# Patient Record
Sex: FEMALE | Race: BLACK OR AFRICAN AMERICAN | NOT HISPANIC OR LATINO | Employment: FULL TIME | URBAN - METROPOLITAN AREA
[De-identification: names, ages, dates, MRNs, and addresses within clinical notes are randomized per-mention and may not be internally consistent; named-entity substitution may affect disease eponyms.]

---

## 2021-12-06 ENCOUNTER — PATIENT MESSAGE (OUTPATIENT)
Dept: RESEARCH | Facility: HOSPITAL | Age: 31
End: 2021-12-06
Payer: MEDICAID

## 2021-12-29 ENCOUNTER — HOSPITAL ENCOUNTER (EMERGENCY)
Facility: HOSPITAL | Age: 31
Discharge: HOME | End: 2021-12-29
Attending: EMERGENCY MEDICINE
Payer: MEDICAID

## 2021-12-29 VITALS
RESPIRATION RATE: 18 BRPM | OXYGEN SATURATION: 100 % | HEIGHT: 65 IN | WEIGHT: 143 LBS | TEMPERATURE: 98 F | BODY MASS INDEX: 23.82 KG/M2 | SYSTOLIC BLOOD PRESSURE: 121 MMHG | HEART RATE: 73 BPM | DIASTOLIC BLOOD PRESSURE: 76 MMHG

## 2021-12-29 DIAGNOSIS — Z20.822 LAB TEST NEGATIVE FOR COVID-19 VIRUS: Primary | ICD-10-CM

## 2021-12-29 LAB — SARS-COV-2 RDRP RESP QL NAA+PROBE: NEGATIVE

## 2021-12-29 PROCEDURE — 99282 EMERGENCY DEPT VISIT SF MDM: CPT

## 2021-12-29 PROCEDURE — U0002 COVID-19 LAB TEST NON-CDC: HCPCS | Performed by: NURSE PRACTITIONER

## 2021-12-29 NOTE — Clinical Note
"Eddie"ROBERT Cheney was seen and treated in our emergency department on 12/29/2021.     COVID-19 is present in our communities across the state. There is limited testing for COVID at this time, so not all patients can be tested. In this situation, your employee meets the following criteria:    Eddie Cheney has met the criteria for COVID-19 testing and has a NEGATIVE result. The employee can return to work once they are asymptomatic for 72 hours without the use of fever reducing medications (Tylenol, Motrin, etc).     If you have any questions or concerns, or if I can be of further assistance, please do not hesitate to contact me.    Sincerely,             Joni Trimble NP"

## 2021-12-29 NOTE — ED PROVIDER NOTES
Encounter Date: 12/29/2021       History     Chief Complaint   Patient presents with    COVID-19 Concerns     Pt had recent exposure to COVID, reports chills, body aches, night sweats, HA.     Pt. C/o frontal headache relieved with motrin last night. Pt. Exposed to covid 19.     The history is provided by the patient.   URI  Primary symptoms do not include fever, fatigue, sore throat, cough, wheezing, nausea or rash.   The onset of the illness is associated with exposure to sick contacts. The following treatments were addressed: Acetaminophen was not tried. A decongestant was not tried.     Review of patient's allergies indicates:   Allergen Reactions    Tramadol      No past medical history on file.  No past surgical history on file.  Family History   Problem Relation Age of Onset    Cancer Neg Hx      Social History     Tobacco Use    Smoking status: Current Every Day Smoker     Packs/day: 0.50     Years: 10.00     Pack years: 5.00     Types: Cigarettes    Smokeless tobacco: Never Used   Substance Use Topics    Alcohol use: No    Drug use: Yes     Frequency: 7.0 times per week     Types: Marijuana     Review of Systems   Constitutional: Negative for fatigue and fever.   HENT: Negative for sore throat.    Respiratory: Negative for cough, shortness of breath and wheezing.    Cardiovascular: Negative for chest pain.   Gastrointestinal: Negative for nausea.   Genitourinary: Negative for dysuria.   Musculoskeletal: Negative for back pain.   Skin: Negative for rash.   Neurological: Negative for weakness.   Hematological: Does not bruise/bleed easily.       Physical Exam     Initial Vitals [12/29/21 1106]   BP Pulse Resp Temp SpO2   121/76 73 18 98.2 °F (36.8 °C) 100 %      MAP       --         Physical Exam    Nursing note and vitals reviewed.  Constitutional: She appears well-developed and well-nourished.   HENT:   Head: Normocephalic and atraumatic.   Eyes: Conjunctivae and EOM are normal. Pupils are equal,  round, and reactive to light.   Neck: Neck supple.   Normal range of motion.  Cardiovascular: Normal rate, regular rhythm, normal heart sounds and intact distal pulses.   Pulmonary/Chest: Breath sounds normal.   Abdominal: Abdomen is soft. There is no abdominal tenderness. There is no rebound and no guarding.   Musculoskeletal:         General: Normal range of motion.      Cervical back: Normal range of motion and neck supple.     Neurological: She is alert and oriented to person, place, and time. She has normal strength and normal reflexes.   Skin: Skin is warm and dry.   Psychiatric: She has a normal mood and affect. Her behavior is normal. Thought content normal.         ED Course   Procedures  Labs Reviewed   SARS-COV-2 RNA AMPLIFICATION, QUAL          Imaging Results    None          Medications - No data to display                       Clinical Impression:   Final diagnoses:  [Z20.822] Lab test negative for COVID-19 virus (Primary)          ED Disposition Condition    Discharge Stable        ED Prescriptions     None        Follow-up Information     Follow up With Specialties Details Why Contact Info    pcp  Schedule an appointment as soon as possible for a visit  As needed            Joni Trimble NP  12/29/21 2021

## 2022-04-07 LAB
PAP RECOMMENDATION EXT: NORMAL
PAP SMEAR: NORMAL

## 2023-01-20 ENCOUNTER — DOCUMENTATION ONLY (OUTPATIENT)
Dept: ADMINISTRATIVE | Facility: HOSPITAL | Age: 33
End: 2023-01-20
Payer: MEDICAID

## 2024-03-21 ENCOUNTER — OFFICE VISIT (OUTPATIENT)
Dept: OBSTETRICS AND GYNECOLOGY | Facility: CLINIC | Age: 34
End: 2024-03-21
Payer: MEDICAID

## 2024-03-21 VITALS
SYSTOLIC BLOOD PRESSURE: 108 MMHG | HEART RATE: 62 BPM | BODY MASS INDEX: 26.76 KG/M2 | WEIGHT: 160.81 LBS | DIASTOLIC BLOOD PRESSURE: 60 MMHG

## 2024-03-21 DIAGNOSIS — Z11.3 SCREEN FOR STD (SEXUALLY TRANSMITTED DISEASE): Primary | ICD-10-CM

## 2024-03-21 DIAGNOSIS — Z01.419 ROUTINE GYNECOLOGICAL EXAMINATION: ICD-10-CM

## 2024-03-21 PROCEDURE — 3078F DIAST BP <80 MM HG: CPT | Mod: CPTII,,, | Performed by: OBSTETRICS & GYNECOLOGY

## 2024-03-21 PROCEDURE — 99395 PREV VISIT EST AGE 18-39: CPT | Mod: ,,, | Performed by: OBSTETRICS & GYNECOLOGY

## 2024-03-21 PROCEDURE — 3074F SYST BP LT 130 MM HG: CPT | Mod: CPTII,,, | Performed by: OBSTETRICS & GYNECOLOGY

## 2024-03-21 PROCEDURE — 1160F RVW MEDS BY RX/DR IN RCRD: CPT | Mod: CPTII,,, | Performed by: OBSTETRICS & GYNECOLOGY

## 2024-03-21 PROCEDURE — 1159F MED LIST DOCD IN RCRD: CPT | Mod: CPTII,,, | Performed by: OBSTETRICS & GYNECOLOGY

## 2024-03-21 PROCEDURE — 3008F BODY MASS INDEX DOCD: CPT | Mod: CPTII,,, | Performed by: OBSTETRICS & GYNECOLOGY

## 2024-03-21 NOTE — PROGRESS NOTES
Patient ID: [id number]   Chief Complaint: Annual exam  Chief Complaint   Patient presents with    Well Woman     C/O IRREGULAR CYCLES  FOR TWO MONTHS     HPI:   Eddie Cheney is a 33 y.o. year old  here for her Annual Exam. Patient's last menstrual period was 2024 (exact date).   C/O IRREGULAR CYCLES IN  AND FEB, SPOTTING FOR FEW DAYS, THEN BEGINNING FULL CYCLE A WEEK LATER.   ON NO HORMONAL BIRTH CONTROL  HAS H/O ANXIETY AND DEPRESSION, ON NO MEDS, PT SELF D/C'D MEDS BECAUSE OF SIDE EFFECTS.   SHE FAILED THE DEPRESSION SCREEN, DECLINES MEDS OR CONSELING.     Subjective:     Past Medical History:   Diagnosis Date    Anxiety disorder, unspecified     Depression      Past Surgical History:   Procedure Laterality Date    ANKLE FRACTURE SURGERY Right     RIGHT HAND FRACTURE Right     WISDOM TOOTH EXTRACTION       Social History     Tobacco Use    Smoking status: Every Day     Current packs/day: 0.50     Average packs/day: 0.5 packs/day for 10.0 years (5.0 ttl pk-yrs)     Types: Cigarettes    Smokeless tobacco: Never   Substance Use Topics    Alcohol use: Yes     Comment: SOCIAL    Drug use: Yes     Frequency: 7.0 times per week     Types: Marijuana     Family History   Problem Relation Age of Onset    Diabetes Mother     Hypertension Father     Heart attack Father         X2    Cancer Neg Hx      OB History    Para Term  AB Living   2 2 2     2   SAB IAB Ectopic Multiple Live Births           2      # Outcome Date GA Lbr Tab/2nd Weight Sex Delivery Anes PTL Lv   2 Term 19    M Vag-Spont   JOAQUÍN   1 Term 13    M Vag-Spont   JOAQUÍN     No current outpatient medications on file.    MENARCHEAL:  Cycle Length: 5 days   Flow: normal  Dysmenorrhea: Yes  If yes: Mild  Intermenstrual Bleeding: Yes    PAP:  Last PAP: 2022    History of Abnormal PAP Smear: NO  HPV Vaccine: NO    INTERCOURSE:  Dyspareunia: No  Postcoital Bleeding: No  History of STI: Yes   If yes, then: CZ  Current Birth  Control Method: none  Sexually Active: yes    Review of Systems 12 point review of systems conducted, negative except as stated in the history of present illness.   See HPI for details.  Objective:   Visit Vitals  /60   Pulse 62   Wt 72.9 kg (160 lb 12.8 oz)   LMP 02/25/2024 (Exact Date)   BMI 26.76 kg/m²     No results found for this or any previous visit (from the past 24 hour(s)).  Physical Exam:  Chaperone present for exam.  Physical Exam  Constitutional:  General Appearance : alert, in no acute distress, normal, well nourished.  Cardiovascular:   Regular rate and rhythm.  Respiratory:  Respiratory Effort: normal.  Breast:  Right: Inspection/palpation: no discharge, no masses present, no nipple retraction, no skin changes, no skin dimpling, no tenderness, no lymphadenopathy, no axillary mass, no axillary tenderness.  Left: Inspection/palpation: no discharge, no masses present, no nipple retraction, no skin changes, no skin dimpling, no tenderness, no lymphadenopathy, no axillary mass, no axillary tenderness.  Gastrointestinal:  Abdomen: no masses. no tender, nondistended.  Hernias: no hernias present.  Genitourinary:  External Genitalia: normal, no lesions.  Vagina: normal appearance, no abnormal discharge, no lesions.  Bladder: no mass, nontender.  Urethra: no erythema or lesions present.  Cervix: no lesions, non tender. Pap Done STD testing Via pap.   Uterus: nontender, normal contour, normal mobility, normal size.   Adnexa: no masses, no tenderness.  Anus and Perineum: visually normal.     No results found for this or any previous visit (from the past 24 hour(s)).  Assessment/Plan:   Assessment:   Screen for STD (sexually transmitted disease)  -     Liquid-Based Pap Smear, Screening Screening; Future; Expected date: 03/21/2024    Routine gynecological examination  -     Liquid-Based Pap Smear, Screening Screening; Future; Expected date: 03/21/2024    Follow up in about 1 year (around 3/21/2025) for  ANNUAL.   In addition to their scheduled follow-up, the patient has also been instructed to follow up on as needed basis.   All questions were answered and the patient voiced understanding of the above issues.

## 2024-03-27 LAB — PSYCHE PATHOLOGY RESULT: NORMAL

## 2024-04-17 ENCOUNTER — PROCEDURE VISIT (OUTPATIENT)
Dept: OBSTETRICS AND GYNECOLOGY | Facility: CLINIC | Age: 34
End: 2024-04-17
Payer: MEDICAID

## 2024-04-17 VITALS
SYSTOLIC BLOOD PRESSURE: 110 MMHG | BODY MASS INDEX: 26.56 KG/M2 | HEART RATE: 95 BPM | WEIGHT: 159.63 LBS | DIASTOLIC BLOOD PRESSURE: 82 MMHG

## 2024-04-17 DIAGNOSIS — R87.610 ATYPICAL SQUAMOUS CELL CHANGES OF UNDETERMINED SIGNIFICANCE (ASCUS) ON CERVICAL CYTOLOGY WITH POSITIVE HIGH RISK HUMAN PAPILLOMA VIRUS (HPV): Primary | ICD-10-CM

## 2024-04-17 DIAGNOSIS — R87.810 ATYPICAL SQUAMOUS CELL CHANGES OF UNDETERMINED SIGNIFICANCE (ASCUS) ON CERVICAL CYTOLOGY WITH POSITIVE HIGH RISK HUMAN PAPILLOMA VIRUS (HPV): Primary | ICD-10-CM

## 2024-04-17 DIAGNOSIS — N92.6 IRREGULAR PERIODS/MENSTRUAL CYCLES: ICD-10-CM

## 2024-04-17 LAB
B-HCG UR QL: NEGATIVE
CTP QC/QA: YES
ERYTHROCYTE [DISTWIDTH] IN BLOOD BY AUTOMATED COUNT: 13.1 % (ref 11–14.5)
HCT VFR BLD AUTO: 38 % (ref 36–48)
HGB BLD-MCNC: 13.4 G/DL (ref 11.8–16)
MCH RBC QN AUTO: 31.6 PG (ref 27–34)
MCHC RBC AUTO-ENTMCNC: 35.3 G/DL (ref 31–37)
MCV RBC AUTO: 89.6 FL (ref 79–99)
NRBC BLD AUTO-RTO: 0 %
PLATELET # BLD AUTO: 283 X10(3)/MCL (ref 140–371)
PMV BLD AUTO: 11.6 FL (ref 9.4–12.4)
RBC # BLD AUTO: 4.24 X10(6)/MCL (ref 4–5.1)
T4 FREE SERPL-MCNC: 1.19 NG/DL (ref 0.78–2.19)
TSH SERPL-ACNC: 2.46 UIU/ML (ref 0.36–3.74)
WBC # SPEC AUTO: 8.42 X10(3)/MCL (ref 4–11.5)

## 2024-04-17 PROCEDURE — 85027 COMPLETE CBC AUTOMATED: CPT | Performed by: OBSTETRICS & GYNECOLOGY

## 2024-04-17 PROCEDURE — 81025 URINE PREGNANCY TEST: CPT | Mod: ,,, | Performed by: OBSTETRICS & GYNECOLOGY

## 2024-04-17 PROCEDURE — 84443 ASSAY THYROID STIM HORMONE: CPT | Performed by: OBSTETRICS & GYNECOLOGY

## 2024-04-17 PROCEDURE — 84439 ASSAY OF FREE THYROXINE: CPT | Performed by: OBSTETRICS & GYNECOLOGY

## 2024-04-17 PROCEDURE — 57452 EXAM OF CERVIX W/SCOPE: CPT | Mod: ,,, | Performed by: OBSTETRICS & GYNECOLOGY

## 2024-04-17 NOTE — PROGRESS NOTES
Patient ID: [id number]   Chief Complaint: Colposcopy, COMPLAINS OF IRREGULAR CYCLES.      HPI:   Eddie Cheney is a 33 y.o.  here today for Colposcopy   COMPLAINS OF IRREGULAR CYCLES THIS ENTIRE YEAR  HEAVY AT TIMES WITH CLOTS AND MODERATE DYSMENORRHEA.      Patient's last menstrual period was 2024 (approximate).  Past Medical History:  has a past medical history of Abnormal Pap smear of cervix, Anxiety disorder, unspecified, Depression, and HPV in female.  Surgical History:  has a past surgical history that includes Ankle fracture surgery (Right); RIGHT HAND FRACTURE (Right); and [place] tooth extraction.  Family History: family history includes Cancer in her maternal grandmother; Cervical cancer in her maternal grandmother; Diabetes in her mother; Heart disease in her father; Hypertension in her father.  Social History:  reports that she has been smoking cigarettes. She has a 5 pack-year smoking history. She has never used smokeless tobacco. She reports current alcohol use. She reports current drug use. Frequency: 7.00 times per week. Drug: Marijuana.  No current outpatient medications on file.     No current facility-administered medications for this visit.     Patient is allergic to tramadol.    Recent Results (from the past 24 hour(s))   POCT Urine Pregnancy    Collection Time: 24  3:18 PM   Result Value Ref Range    POC Preg Test, Ur Negative Negative     Acceptable Yes      Subjective:   Review of Systems  12 point review of systems conducted, negative except as stated in the history of present illness. See HPI for details.  Objective:     Visit Vitals  /82   Pulse 95   Wt 72.4 kg (159 lb 9.6 oz)   LMP 2024 (Approximate)   BMI 26.56 kg/m²     Recent Results (from the past 24 hour(s))   POCT Urine Pregnancy    Collection Time: 24  3:18 PM   Result Value Ref Range    POC Preg Test, Ur Negative Negative     Acceptable Yes      Physical  Exam  Constitutional:  General Appearance : alert, in no acute distress, normal, well nourished.  Neck/Thyroid:  Inspection/Palpation: normal. Thyroid: normal size and shape.  Respiratory:  Respiratory Effort: normal.  Gastrointestinal:  Abdomen: no masses. no tender, nondistended.  Liver and spleen: normal  Hernias: no hernias present, no inguinal adenopathy.  Genitourinary:  External Genitalia: normal, no lesions.  Vagina: normal appearance, no abnormal discharge, no lesions.  Bladder: no mass, nontender.  Urethra: no erythema or lesions present.  Cervix: no lesions, non tender. SEE COLPO NOTE  Uterus: nontender, normal contour, normal mobility, normal size.   Adnexa: no masses, no tenderness.  Anus and Perineum: visually normal.   Chaperone Present  Assessment:       ICD-10-CM ICD-9-CM   1. Atypical squamous cell changes of undetermined significance (ASCUS) on cervical cytology with positive high risk human papilloma virus (HPV)  R87.610 795.01    R87.810 795.05   2. Irregular periods/menstrual cycles  N92.6 626.4     Plan   Atypical squamous cell changes of undetermined significance (ASCUS) on cervical cytology with positive high risk human papilloma virus (HPV)  -     POCT Urine Pregnancy  -     Colposcopy    Irregular periods/menstrual cycles  -     CBC Without Differential  -     TSH  -     T4, Free  -     US Pelvis Limited Non OB; Future; Expected date: 04/17/2024    Follow up in about 2 weeks (around 5/1/2024), or RESULTS/one year for annual. In addition to their scheduled follow up, the patient has also been instructed to follow up on as needed basis.     ROSSANA RAMOS MD

## 2024-04-17 NOTE — PROCEDURES
Colposcopy    Date/Time: 4/17/2024 2:30 PM    Performed by: Chan Charlton MD  Authorized by: Chan Charlton MD    Consent obatined:  Prior to procedure the appropriate consent was completed and verified  Timeout:Immediately prior to procedure a time out was called to verify the correct patient, procedure, equipment, support staff and site/side marked as required  Prep:Patient was prepped and draped in the usual sterile fashion  Assistants?: Yes    List of assistants:  MONIKA EASON was present for the entire procedure.    Colposcopy Site:  Cervix  Position:  Supine  Acrowhite Lesion: No    Atypical Vessels: No    Transformation Zone Adequate?: Yes    Biopsy?: No    ECC Performed?: No    LEEP Performed?: No    Estimated blood loss (cc):  0   Patient tolerated the procedure well with no immediate complications.   Post-operative instructions were provided for the patient.   Patient was discharged and will follow up if any complications occur     PT WILL NEED RE-PAP IN ONE YEAR.

## 2024-05-01 ENCOUNTER — OFFICE VISIT (OUTPATIENT)
Dept: OBSTETRICS AND GYNECOLOGY | Facility: CLINIC | Age: 34
End: 2024-05-01
Payer: MEDICAID

## 2024-05-01 VITALS
WEIGHT: 155.63 LBS | HEART RATE: 78 BPM | BODY MASS INDEX: 25.93 KG/M2 | RESPIRATION RATE: 18 BRPM | OXYGEN SATURATION: 99 % | HEIGHT: 65 IN | DIASTOLIC BLOOD PRESSURE: 62 MMHG | SYSTOLIC BLOOD PRESSURE: 102 MMHG

## 2024-05-01 DIAGNOSIS — N92.6 IRREGULAR PERIODS/MENSTRUAL CYCLES: Primary | ICD-10-CM

## 2024-05-01 PROCEDURE — 3074F SYST BP LT 130 MM HG: CPT | Mod: CPTII,,, | Performed by: OBSTETRICS & GYNECOLOGY

## 2024-05-01 PROCEDURE — 3008F BODY MASS INDEX DOCD: CPT | Mod: CPTII,,, | Performed by: OBSTETRICS & GYNECOLOGY

## 2024-05-01 PROCEDURE — 1159F MED LIST DOCD IN RCRD: CPT | Mod: CPTII,,, | Performed by: OBSTETRICS & GYNECOLOGY

## 2024-05-01 PROCEDURE — 1160F RVW MEDS BY RX/DR IN RCRD: CPT | Mod: CPTII,,, | Performed by: OBSTETRICS & GYNECOLOGY

## 2024-05-01 PROCEDURE — 99213 OFFICE O/P EST LOW 20 MIN: CPT | Mod: ,,, | Performed by: OBSTETRICS & GYNECOLOGY

## 2024-05-01 PROCEDURE — 3078F DIAST BP <80 MM HG: CPT | Mod: CPTII,,, | Performed by: OBSTETRICS & GYNECOLOGY

## 2024-05-01 NOTE — PROGRESS NOTES
Patient ID: [id number]   Chief Complaint: Results (Presents to clinic for colpo results , lab and pelvic us results. )    HPI:   Eddie Cheney is a 33 y.o.  here today for lab and pelvic u/s results.  Colpo was negative.  Cbc and thyroid studies were normal.  U/s was also normal.   Discussed all methods for controlling cycles, patient opts for IUD.  Discussed placing on next cycle.      Patient's last menstrual period was 2024 (approximate).  Past Medical History:  has a past medical history of Abnormal Pap smear of cervix, Anxiety disorder, unspecified, Depression, and HPV in female.  Surgical History:  has a past surgical history that includes Ankle fracture surgery (Right); RIGHT HAND FRACTURE (Right); and [place] tooth extraction.  Family History: family history includes Cancer in her maternal grandmother; Cervical cancer in her maternal grandmother; Diabetes in her mother; Heart disease in her father; Hypertension in her father.  Social History:  reports that she has been smoking cigarettes. She has a 5 pack-year smoking history. She has been exposed to tobacco smoke. She has never used smokeless tobacco. She reports current alcohol use. She reports current drug use. Frequency: 7.00 times per week. Drug: Marijuana.  No current outpatient medications on file.     No current facility-administered medications for this visit.     Patient is allergic to tramadol.    MENARCHEAL:  Cycle Length: 5 days   Flow: heavy  Dysmenorrhea: Yes  If yes: Moderaye  Intermenstrual Bleeding: No  PAP:  Last PAP: 2024    History of Abnormal PAP Smear: YES: ASCUS , HIGH RISK HPV     Treated: Colposcopy  HPV Vaccine: NO  INTERCOURSE:  Dyspareunia: No  Postcoital Bleeding: No  History of STI: Yes   If yes, then: TV  Current Birth Control Method: none  Sexually Active: yes    No results found for this or any previous visit (from the past 24 hour(s)).    Subjective:   Review of Systems  12 point review of systems  "conducted, negative except as stated in the history of present illness. See HPI for details.  Objective:     Visit Vitals  /62 (BP Location: Left arm)   Pulse 78   Resp 18   Ht 5' 5" (1.651 m)   Wt 70.6 kg (155 lb 9.6 oz)   LMP 04/05/2024 (Approximate)   SpO2 99%   BMI 25.89 kg/m²     No results found for this or any previous visit (from the past 24 hour(s)).  Physical Exam  Constitutional:  General Appearance : alert, in no acute distress, normal, well nourished.  Neck/Thyroid:  Inspection/Palpation: normal. Thyroid: normal size and shape.  Respiratory:  Respiratory Effort: normal.  Gastrointestinal:  Abdomen: no masses. no tender, nondistended.  Liver and spleen: normal  Hernias: no hernias present, no inguinal adenopathy.    Assessment:       ICD-10-CM ICD-9-CM   1. Irregular periods/menstrual cycles  N92.6 626.4     Plan   Irregular periods/menstrual cycles    Labs and u/s were nl.  Discussed hormonal options for controlling cycles. Would like to do Fly IUD. Will place on next cycle    Follow up in 10 months (on 3/1/2025), or if symptoms worsen or fail to improve, for fly insertion. In addition to their scheduled follow up, the patient has also been instructed to follow up on as needed basis.     ROSSNAA RAMOS MD    "

## 2024-09-09 ENCOUNTER — OFFICE VISIT (OUTPATIENT)
Dept: OBSTETRICS AND GYNECOLOGY | Facility: CLINIC | Age: 34
End: 2024-09-09
Payer: MEDICAID

## 2024-09-09 VITALS
OXYGEN SATURATION: 98 % | SYSTOLIC BLOOD PRESSURE: 122 MMHG | HEIGHT: 65 IN | BODY MASS INDEX: 24.99 KG/M2 | WEIGHT: 150 LBS | RESPIRATION RATE: 18 BRPM | HEART RATE: 81 BPM | DIASTOLIC BLOOD PRESSURE: 70 MMHG

## 2024-09-09 DIAGNOSIS — R11.2 NAUSEA AND VOMITING, UNSPECIFIED VOMITING TYPE: ICD-10-CM

## 2024-09-09 DIAGNOSIS — N91.2 AMENORRHEA: Primary | ICD-10-CM

## 2024-09-09 LAB
B-HCG UR QL: POSITIVE
CTP QC/QA: YES

## 2024-09-09 PROCEDURE — 3008F BODY MASS INDEX DOCD: CPT | Mod: CPTII,,, | Performed by: NURSE PRACTITIONER

## 2024-09-09 PROCEDURE — 99213 OFFICE O/P EST LOW 20 MIN: CPT | Mod: ,,, | Performed by: NURSE PRACTITIONER

## 2024-09-09 PROCEDURE — 1160F RVW MEDS BY RX/DR IN RCRD: CPT | Mod: CPTII,,, | Performed by: NURSE PRACTITIONER

## 2024-09-09 PROCEDURE — 81025 URINE PREGNANCY TEST: CPT | Mod: ,,, | Performed by: NURSE PRACTITIONER

## 2024-09-09 PROCEDURE — 1159F MED LIST DOCD IN RCRD: CPT | Mod: CPTII,,, | Performed by: NURSE PRACTITIONER

## 2024-09-09 PROCEDURE — 3074F SYST BP LT 130 MM HG: CPT | Mod: CPTII,,, | Performed by: NURSE PRACTITIONER

## 2024-09-09 PROCEDURE — 3078F DIAST BP <80 MM HG: CPT | Mod: CPTII,,, | Performed by: NURSE PRACTITIONER

## 2024-09-09 RX ORDER — FLUOXETINE HYDROCHLORIDE 20 MG/1
20 CAPSULE ORAL DAILY
COMMUNITY
Start: 2024-08-22

## 2024-09-09 RX ORDER — ONDANSETRON 4 MG/1
4 TABLET, ORALLY DISINTEGRATING ORAL EVERY 8 HOURS PRN
Qty: 30 TABLET | Refills: 1 | Status: SHIPPED | OUTPATIENT
Start: 2024-09-09

## 2024-09-09 NOTE — PROGRESS NOTES
" Patient ID: [id number]   Chief Complaint: Amenorrhea (Positive pregnancy test at home. Had only spotting Aug 5 x 1 day. Patient states" does not know date of cycle in July but had a cycle in JULY" c/o nausea. )  States she thinks her cycle was in middle of July but unsure. She will get with Deepthi FERREIRA to discuss her Prozac. Discussed marijuana use in preg pt states she will dc while preg and was aware already-use was prior to knowing she she was pregnant  HPI:   Edide Cheney is a 33 y.o.  here today for Amenorrhea (Positive pregnancy test at home. Had only spotting Aug 5 x 1 day. Patient states" does not know date of cycle in July but had a cycle in JULY" c/o nausea. )   Patient's last menstrual period was 2024 (exact date).  Past Medical History:  has a past medical history of Abnormal Pap smear of cervix, Anxiety disorder, unspecified, Depression, and HPV in female.  Surgical History:  has a past surgical history that includes Ankle fracture surgery (Right); RIGHT HAND FRACTURE (Right); and [place] tooth extraction.  Family History: family history includes Cancer in her maternal grandmother; Cervical cancer in her maternal grandmother; Diabetes in her mother; Heart disease in her father; Hypertension in her father.  Social History:  reports that she has been smoking cigarettes. She has a 5 pack-year smoking history. She has been exposed to tobacco smoke. She has never used smokeless tobacco. She reports that she does not currently use alcohol. She reports current drug use. Frequency: 7.00 times per week. Drug: Marijuana.  Current Outpatient Medications   Medication Sig Dispense Refill    FLUoxetine 20 MG capsule Take 20 mg by mouth once daily.      ondansetron (ZOFRAN-ODT) 4 MG TbDL Take 1 tablet (4 mg total) by mouth every 8 (eight) hours as needed (NAUSEA). 30 tablet 1     No current facility-administered medications for this visit.     Patient is allergic to tramadol.  MENARCHEAL:  Cycle Length: 1 " "days   Flow: light  Dysmenorrhea: No  If yes: Mild  Intermenstrual Bleeding: No  PAP:  Last PAP: 03/21/2024   History of Abnormal PAP Smear: YES: ascus , HPV     Treated: Colposcopy  HPV Vaccine: NO  INTERCOURSE:  Dyspareunia: Yes  Postcoital Bleeding: No  History of STI: Yes   If yes, then: TV  Current Birth Control Method: none  Sexually Active: yes        Recent Results (from the past 24 hour(s))   POCT Urine Pregnancy    Collection Time: 09/09/24  8:16 AM   Result Value Ref Range    POC Preg Test, Ur Positive (A) Negative     Acceptable Yes        Subjective:   Review of Systems  12 point review of systems conducted, negative except as stated in the history of present illness. See HPI for details.  Objective:     Visit Vitals  /70 (BP Location: Left arm)   Pulse 81   Resp 18   Ht 5' 5" (1.651 m)   Wt 68 kg (150 lb)   LMP 08/05/2024 (Exact Date)   SpO2 98%   BMI 24.96 kg/m²     Recent Results (from the past 24 hour(s))   POCT Urine Pregnancy    Collection Time: 09/09/24  8:16 AM   Result Value Ref Range    POC Preg Test, Ur Positive (A) Negative     Acceptable Yes      Physical Exam  Constitutional:  General Appearance : alert, in no acute distress, normal, well nourished.  Neck/Thyroid:  Inspection/Palpation: normal. Thyroid: normal size and shape.  Respiratory:  Respiratory Effort: normal.  Gastrointestinal:  Abdomen: no masses. no tender, nondistended.  Liver and spleen: normal  Hernias: no hernias present, no inguinal adenopathy.    Assessment:       ICD-10-CM ICD-9-CM   1. Amenorrhea  N91.2 626.0   2. Nausea and vomiting, unspecified vomiting type  R11.2 787.01     Plan   Amenorrhea  -     POCT Urine Pregnancy; Future; Expected date: 09/09/2024    Nausea and vomiting, unspecified vomiting type  -     ondansetron (ZOFRAN-ODT) 4 MG TbDL; Take 1 tablet (4 mg total) by mouth every 8 (eight) hours as needed (NAUSEA).  Dispense: 30 tablet; Refill: 1        Follow up in about 4 " weeks (around 10/7/2024) for OB VS. In addition to their scheduled follow up, the patient has also been instructed to follow up on as needed basis.     SUSHILA Smith

## 2024-09-17 ENCOUNTER — INITIAL PRENATAL (OUTPATIENT)
Dept: OBSTETRICS AND GYNECOLOGY | Facility: CLINIC | Age: 34
End: 2024-09-17
Payer: MEDICAID

## 2024-09-17 VITALS
WEIGHT: 149 LBS | BODY MASS INDEX: 24.79 KG/M2 | DIASTOLIC BLOOD PRESSURE: 60 MMHG | SYSTOLIC BLOOD PRESSURE: 110 MMHG | HEART RATE: 90 BPM

## 2024-09-17 DIAGNOSIS — B37.31 CANDIDIASIS OF VAGINA DURING PREGNANCY: ICD-10-CM

## 2024-09-17 DIAGNOSIS — Z11.3 ENCOUNTER FOR SCREENING FOR INFECTIONS WITH A PREDOMINANTLY SEXUAL MODE OF TRANSMISSION: ICD-10-CM

## 2024-09-17 DIAGNOSIS — Z34.81 ENCOUNTER FOR SUPERVISION OF OTHER NORMAL PREGNANCY IN FIRST TRIMESTER: Primary | ICD-10-CM

## 2024-09-17 DIAGNOSIS — O98.819 CANDIDIASIS OF VAGINA DURING PREGNANCY: ICD-10-CM

## 2024-09-17 DIAGNOSIS — Z3A.09 9 WEEKS GESTATION OF PREGNANCY: ICD-10-CM

## 2024-09-17 DIAGNOSIS — Z36.87 ENCOUNTER FOR ANTENATAL SCREENING FOR UNCERTAIN DATES: ICD-10-CM

## 2024-09-17 LAB
AMPHET UR QL SCN: NEGATIVE
BARBITURATE SCN PRESENT UR: NEGATIVE
BENZODIAZ UR QL SCN: NEGATIVE
BILIRUB UR QL STRIP: NEGATIVE
CANNABINOIDS UR QL SCN: POSITIVE
COCAINE UR QL SCN: NEGATIVE
GLUCOSE UR QL STRIP: NEGATIVE
KETONES UR QL STRIP: NEGATIVE
LEUKOCYTE ESTERASE UR QL STRIP: NEGATIVE
METHADONE UR QL SCN: NEGATIVE
OPIATES UR QL SCN: POSITIVE
PCP UR QL: NEGATIVE
PH UR: 7 [PH] (ref 5–8)
PH, POC UA: 7.5
POC BLOOD, URINE: NEGATIVE
POC NITRATES, URINE: NEGATIVE
PROT UR QL STRIP: NEGATIVE
SP GR UR STRIP: 1.02 (ref 1–1.03)
UROBILINOGEN UR STRIP-ACNC: 0.2 (ref 0.1–1.1)

## 2024-09-17 PROCEDURE — 80307 DRUG TEST PRSMV CHEM ANLYZR: CPT | Performed by: OBSTETRICS & GYNECOLOGY

## 2024-09-17 PROCEDURE — 76817 TRANSVAGINAL US OBSTETRIC: CPT | Mod: ,,, | Performed by: OBSTETRICS & GYNECOLOGY

## 2024-09-17 PROCEDURE — 87086 URINE CULTURE/COLONY COUNT: CPT | Performed by: OBSTETRICS & GYNECOLOGY

## 2024-09-17 PROCEDURE — 99204 OFFICE O/P NEW MOD 45 MIN: CPT | Mod: TH,,, | Performed by: OBSTETRICS & GYNECOLOGY

## 2024-09-17 RX ORDER — SERTRALINE HYDROCHLORIDE 25 MG/1
25 TABLET, FILM COATED ORAL
COMMUNITY
Start: 2024-09-09

## 2024-09-17 RX ORDER — TERCONAZOLE 4 MG/G
1 CREAM VAGINAL NIGHTLY
Qty: 45 G | Refills: 0 | Status: SHIPPED | OUTPATIENT
Start: 2024-09-17

## 2024-09-17 NOTE — PROGRESS NOTES
Chief Complaint: New OB     HPI:      Eddie Cheney is a 33 y.o.  who presents to clinic for new ob. Initial Prenatal Visit (PT DENIES VAGINAL BLEEDING AND CRAMPING, OTHERWISE NO C/O'S.)  .   Patient's last menstrual period was 2024 (exact date).   Last pap 3/27/2024      Past Medical History:   Diagnosis Date    Abnormal Pap smear of cervix     Anxiety disorder, unspecified     Depression     HPV in female      Past Surgical History:   Procedure Laterality Date    ANKLE FRACTURE SURGERY Right     RIGHT HAND FRACTURE Right     WISDOM TOOTH EXTRACTION       Social History     Tobacco Use    Smoking status: Every Day     Current packs/day: 0.50     Average packs/day: 0.5 packs/day for 10.0 years (5.0 ttl pk-yrs)     Types: Cigarettes     Passive exposure: Current    Smokeless tobacco: Never   Substance Use Topics    Alcohol use: Not Currently     Comment: SOCIAL    Drug use: Yes     Frequency: 7.0 times per week     Types: Marijuana     Family History   Problem Relation Name Age of Onset    Cancer Maternal Grandmother      Cervical cancer Maternal Grandmother      Hypertension Father      Heart disease Father      Diabetes Mother       OB History    Para Term  AB Living   3 2 2     2   SAB IAB Ectopic Multiple Live Births           2      # Outcome Date GA Lbr Tab/2nd Weight Sex Type Anes PTL Lv   3 Current            2 Term 19    M Vag-Spont   JOAQUÍN   1 Term 14    M Vag-Spont   JOAQUÍN       Current Outpatient Medications:     ondansetron (ZOFRAN-ODT) 4 MG TbDL, Take 1 tablet (4 mg total) by mouth every 8 (eight) hours as needed (NAUSEA)., Disp: 30 tablet, Rfl: 1    sertraline (ZOLOFT) 25 MG tablet, Take 25 mg by mouth., Disp: , Rfl:     terconazole (TERAZOL 7) 0.4 % Crea, Place 1 applicator vaginally every evening., Disp: 45 g, Rfl: 0      ROS:     Review of Systems   General/Constitutional:  Hot flash denies . Chills denies. Fatigue/weakness denies . Fever denies . Night sweats  denies .  Respiratory:  Cough denies . Hemoptysis denies . SOB denies . Sputum production denies . Wheezing denies .  Breast:  Changes in skin of nipple or breast denies . Breast lump denies. Breast pain denies. Nipple discharge denies .  Cardiovascular:  Chest pain denies . Dizziness denies . Palpitations denies . Swelling in hands/feet denies .   Gastrointestinal:  Abdominal pain denies . Blood in stool denies . Constipation denies . Diarrhea denies . Heartburn denies . Nausea denies . Vomiting denies .  Women Only:  Vaginal bleeding denies . Vaginal discharge/ Odor denies . Vaginal lesion/pain denies Breakthrough bleeding denies . Pelvic pain denies . Decreased libido denies. Vulvar lesion/ulcer denies . Prolapse of genital organs denies . Heavy bleeding during menses denies. Irregular menses denies . Painful intercourse denies . Painful menses denies .  Genitourinary:  Incontinence denies . Blood in urine denies. Frequest urination denies . Painful urination denies.  Musculoskeletal:  Joint/merary pain denies. Decreased ROM denies. Muscle aches denies. Swollen joints denies . Weakness denies.  Neurologic:  Confusion denies. Trouble walking denies. Trouble with balance denies Balance difficulty denies. Gait abnormalities denies. Headache denies. Tingling/Numbness denies.  Psychiatric:  Anisa denies. Homicidal thoughts denies. Mood lability denies. Personality changes denies. Anxiety or panic attacks denies. Auditory/visual hallucinations denies. Delusions denies. Depressed mood denies. Suicidal thoughts denies.    Physical Exam:   /60   Pulse 90   Wt 67.6 kg (149 lb)   LMP 08/05/2024 (Exact Date)   BMI 24.79 kg/m²   Body mass index is 24.79 kg/m².   PHYSICAL EXAM:  Constitutional:  General Appearance : alert, in no acute distress, normal, well nourished.  Neck/Thyroid:  Inspection/Palpation: normal. Thyroid: normal size and shape.  Respiratory:  Auscultation: clear to auscultation bilaterally. Respiratory  Effort: normal.  Breast:  NO BREAST EXAM TODAY  Gastrointestinal:  Abdomen: no masses. no tender, nondistended.  Liver and spleen: normal  Hernias: no hernias present, no inguinal adenopathy.  Genitourinary:  External Genitalia: normal, no lesions.  Vagina: normal appearance, no abnormal discharge, no lesions.  Bladder: no mass, nontender.  Urethra: no erythema or lesions present.  Cervix: no lesions, non tender. ONE SWAB COLLECTED  Uterus: nontender, normal contour, normal mobility, normal size.  Adnexa: no masses, no tenderness.  Anus and Perineum: visually normal.   Chaperone Present    Assessment/Plan:     Eddie was seen today for initial prenatal visit.    Diagnoses and all orders for this visit:    Encounter for supervision of other normal pregnancy in first trimester  -     POCT Urinalysis, Dipstick, Automated, W/O Scope; Future  -     Drug Screen, Urine; Future  -     Urine Culture High Risk  -     MDL Sendout Test  -     Drug Screen, Urine  -     POCT Urinalysis, Dipstick, Automated, W/O Scope    Encounter for screening for infections with a predominantly sexual mode of transmission  -     MD Sendout Test    Encounter for  screening for uncertain dates  -     US OB/GYN Procedure ([facility]) - Extended List - Future    9 weeks gestation of pregnancy    Candidiasis of vagina during pregnancy  -     terconazole (TERAZOL 7) 0.4 % Crea; Place 1 applicator vaginally every evening.        Follow up in about 4 weeks (around 10/15/2024) for OB FOLLOW UP.    Counselin. Encouraged compliance with prenatal vitamins.  2. Discussed PNL and genetic testing.   3. Safety of exercise discussed with patient, and continued active lifestyle encouraged.  4. COVID-19 virus precautions for both patient and her spouse discussed, and available data on COVID vaccination reviewed.  5. Normal course of prenatal care reviewed.  6. Medications safe in pregnancy discussed and list provided.    Use of the MyChart Patient  Portal discussed and encouraged during today's visit.

## 2024-09-20 LAB — BACTERIA UR CULT: NO GROWTH

## 2024-09-26 PROCEDURE — 86780 TREPONEMA PALLIDUM: CPT | Performed by: OBSTETRICS & GYNECOLOGY

## 2024-09-26 PROCEDURE — 85660 RBC SICKLE CELL TEST: CPT | Performed by: OBSTETRICS & GYNECOLOGY

## 2024-09-26 PROCEDURE — 87389 HIV-1 AG W/HIV-1&-2 AB AG IA: CPT | Performed by: OBSTETRICS & GYNECOLOGY

## 2024-09-26 PROCEDURE — 86762 RUBELLA ANTIBODY: CPT | Performed by: OBSTETRICS & GYNECOLOGY

## 2024-09-26 PROCEDURE — 85027 COMPLETE CBC AUTOMATED: CPT | Performed by: OBSTETRICS & GYNECOLOGY

## 2024-09-26 PROCEDURE — 87340 HEPATITIS B SURFACE AG IA: CPT | Performed by: OBSTETRICS & GYNECOLOGY

## 2024-09-26 PROCEDURE — 86803 HEPATITIS C AB TEST: CPT | Performed by: OBSTETRICS & GYNECOLOGY

## 2024-10-01 ENCOUNTER — TELEPHONE (OUTPATIENT)
Dept: OBSTETRICS AND GYNECOLOGY | Facility: CLINIC | Age: 34
End: 2024-10-01
Payer: MEDICAID

## 2024-10-01 NOTE — TELEPHONE ENCOUNTER
CALLED PT.  CONFIRMED. STATES STARTED SPOTTING WITH SEVERE CRAMPING LAST NIGHT. ALSO C/O NAUSEA AND VOMITING. STATES CAN ONLY KEEP LITTLE SIPS OF WATER DOWN. DENIES RECENT INTERCOURSE.CONT. TO C/O SPOTTING AND CRAMPING NOW. [place]OVER NP NOTIFIED OF CONVERSATION. INSTRUCTED TO GO TO ER NOW VERBALIZED UNDERSTANDING.

## 2024-10-01 NOTE — TELEPHONE ENCOUNTER
PT. CALLED OFFICE AND LEFT MESSAGE TO HAVE SOMEONE CALL HER. I RETURNED HER CALL.  CONFIRMED. STATES AT ER NOW THEY DID U/S AND BABY LOOKED OKAY,BUT SAID I HAVE A UTI AND SAID I   HAVE THREATENED MISCARRIAGE. THEY WANT ME TO BE OFF FOR COUPLE OF DAYS AND I WILL NEED TO BE ON LIGHT DUTY AT WORK. INSTRUCTED DR. RAMOS OUT OF OFFICE AND WILL NEED TO BE SEEN  TO DECIDE ON HER WORK DUTIES. CALL TRANSFERRED TO  TO MOVE SCHEDULED APPOINT TO SOONER DAY.

## 2024-10-01 NOTE — TELEPHONE ENCOUNTER
----- Message from Med Assistant Marcelo sent at 10/1/2024  9:46 AM CDT -----  Regarding: CALL BACK  Pt left a message with the answering service saying that she needs an rx refill and having some light spotting.

## 2024-10-07 ENCOUNTER — ROUTINE PRENATAL (OUTPATIENT)
Dept: OBSTETRICS AND GYNECOLOGY | Facility: CLINIC | Age: 34
End: 2024-10-07
Payer: MEDICAID

## 2024-10-07 VITALS
WEIGHT: 144 LBS | DIASTOLIC BLOOD PRESSURE: 60 MMHG | SYSTOLIC BLOOD PRESSURE: 120 MMHG | BODY MASS INDEX: 23.96 KG/M2 | HEART RATE: 98 BPM

## 2024-10-07 DIAGNOSIS — R10.32 LEFT LOWER QUADRANT ABDOMINAL PAIN AFFECTING PREGNANCY IN FIRST TRIMESTER: ICD-10-CM

## 2024-10-07 DIAGNOSIS — O26.891 LEFT LOWER QUADRANT ABDOMINAL PAIN AFFECTING PREGNANCY IN FIRST TRIMESTER: ICD-10-CM

## 2024-10-07 DIAGNOSIS — Z34.81 ENCOUNTER FOR SUPERVISION OF OTHER NORMAL PREGNANCY IN FIRST TRIMESTER: Primary | ICD-10-CM

## 2024-10-07 DIAGNOSIS — Z3A.12 12 WEEKS GESTATION OF PREGNANCY: ICD-10-CM

## 2024-10-07 DIAGNOSIS — O99.891 BACK PAIN AFFECTING PREGNANCY IN FIRST TRIMESTER: ICD-10-CM

## 2024-10-07 DIAGNOSIS — O99.321 DRUG USE AFFECTING PREGNANCY IN FIRST TRIMESTER: ICD-10-CM

## 2024-10-07 DIAGNOSIS — M54.9 BACK PAIN AFFECTING PREGNANCY IN FIRST TRIMESTER: ICD-10-CM

## 2024-10-07 LAB
BILIRUB UR QL STRIP: NEGATIVE
GLUCOSE UR QL STRIP: NEGATIVE
KETONES UR QL STRIP: POSITIVE
LEUKOCYTE ESTERASE UR QL STRIP: NEGATIVE
PH, POC UA: 7
POC BLOOD, URINE: NEGATIVE
POC NITRATES, URINE: NEGATIVE
PROT UR QL STRIP: NEGATIVE
SP GR UR STRIP: 1.02 (ref 1–1.03)
UROBILINOGEN UR STRIP-ACNC: 0.2 (ref 0.1–1.1)

## 2024-10-07 PROCEDURE — 80307 DRUG TEST PRSMV CHEM ANLYZR: CPT | Performed by: NURSE PRACTITIONER

## 2024-10-07 RX ORDER — CEFDINIR 300 MG/1
300 CAPSULE ORAL EVERY 12 HOURS
COMMUNITY
Start: 2024-10-01

## 2024-10-07 NOTE — PROGRESS NOTES
HPI  George Cheney is a 33 y.o.  12w0d here for Routine Prenatal Visit (C/O PELVIC PAIN AND BACK PAIN SINCE LAST WEEK WENT TO [facility] FOR BROWN SPOTTING, HAD UTI. TODAY NO VAGINAL BLEEDING. 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 REPORTS SHE DOES WELL MOVING PT AT WORK WITH TWO PPL BUT NOT ALONE. )  .  Denies VB and pelvic pain.    GEORGE's allergies were reviewed and updated as appropriate.    Past Medical History:   Diagnosis Date    Abnormal Pap smear of cervix     Anxiety disorder, unspecified     Depression     HPV in female      Family History   Problem Relation Name Age of Onset    Cancer Maternal Grandmother      Cervical cancer Maternal Grandmother      Hypertension Father      Heart disease Father      Diabetes Mother       Social History     Tobacco Use    Smoking status: Every Day     Current packs/day: 0.50     Average packs/day: 0.5 packs/day for 10.0 years (5.0 ttl pk-yrs)     Types: Cigarettes     Passive exposure: Current    Smokeless tobacco: Never   Substance Use Topics    Alcohol use: Not Currently     Comment: SOCIAL    Drug use: Yes     Frequency: 7.0 times per week     Types: Marijuana     OB History    Para Term  AB Living   3 2 2     2   SAB IAB Ectopic Multiple Live Births           2      # Outcome Date GA Lbr Tab/2nd Weight Sex Type Anes PTL Lv   3 Current            2 Term /    M Vag-Spont   JOAQUÍN   1 Term /    M Vag-Spont   JOAQUÍN       Current Outpatient Medications:     cefdinir (OMNICEF) 300 MG capsule, Take 300 mg by mouth every 12 (twelve) hours., Disp: , Rfl:     ondansetron (ZOFRAN-ODT) 4 MG TbDL, Take 1 tablet (4 mg total) by mouth every 8 (eight) hours as needed (NAUSEA)., Disp: 30 tablet, Rfl: 1    sertraline (ZOLOFT) 25 MG tablet, Take 25 mg by mouth., Disp: , Rfl:     terconazole (TERAZOL 7) 0.4 % Crea, Place 1 applicator vaginally every evening., Disp: 45 g, Rfl: 0    ROS:  A comprehensive review of symptoms  "was completed and negative except as noted above.    Physical Exam:  Chaperone present for exam.    /60   Pulse 98   Wt 65.3 kg (144 lb)   LMP 08/05/2024 (Exact Date)   BMI 23.96 kg/m²     Gen: No distress.  Abdomen: Gravid, non-tender.  Pelvic:   Extremities: No edema.    Fetal Heart Rate: 158    Recent Results (from the past 24 hours)   POCT Urinalysis, Dipstick, Automated, W/O Scope    Collection Time: 10/07/24  2:48 PM   Result Value Ref Range    POC Blood, Urine Negative Negative    POC Bilirubin, Urine Negative Negative    POC Urobilinogen, Urine 0.2 0.1 - 1.1    POC Ketones, Urine Positive (A) Negative    POC Protein, Urine Negative Negative    POC Nitrates, Urine Negative Negative    POC Glucose, Urine Negative Negative    pH, UA 7.0     POC Specific Gravity, Urine 1.025 1.003 - 1.029    POC Leukocytes, Urine Negative Negative     PRENATAL LABS:  ABO/Rh:   Lab Results   Component Value Date/Time    GROUPTRH O POS 09/26/2024 02:47 PM     H&H:  Lab Results   Component Value Date/Time    HGB 12.6 09/26/2024 02:47 PM    HCT 34.6 (L) 09/26/2024 02:47 PM     Platelet:  Lab Results   Component Value Date/Time     09/26/2024 02:47 PM     Osulivan: No results found for: "TSZA9EP", "YPZV3SJ", "OIUE1AT"  HIV:   Lab Results   Component Value Date/Time    HIV Nonreactive 09/26/2024 02:47 PM     RPR:  Lab Results   Component Value Date/Time    SYPHAB Nonreactive 09/26/2024 02:47 PM     Hepatitis B Surface Antigen:  Lab Results   Component Value Date/Time    HEPBSAG Negative 09/26/2024 02:47 PM     Hepatitis C:  Lab Results   Component Value Date/Time    HEPCAB Nonreactive 09/26/2024 02:47 PM     Rubella Immune Status:  Lab Results   Component Value Date/Time    RUBELLAIGG 5.40 (L) 09/26/2024 02:47 PM     GBS:No results found for: "SREPBPCR", "STREPBCULT", "STREPONLY"   Last PAP Date: 3/27/2024    ASSESSMENT/PLAN:  1. Encounter for supervision of other normal pregnancy in first trimester  -     POCT " Urinalysis, Dipstick, Automated, W/O Scope    2. 12 weeks gestation of pregnancy    3. Drug use affecting pregnancy in first trimester  -     Drug Screen, Urine    4. Left lower quadrant abdominal pain affecting pregnancy in first trimester    5. Back pain affecting pregnancy in first trimester        Miscarriage precautions discussed with patient, as well as labor unit precautions. Pt aware of risks assoc with drug use in pregnancy. Instr pt no recreational drugs are safe in pregnancy     Follow Up:  Follow up in about 4 weeks (around 11/4/2024) for OB SEX US.

## 2024-10-08 LAB
AMPHET UR QL SCN: ABNORMAL
BARBITURATE SCN PRESENT UR: NEGATIVE
BENZODIAZ UR QL SCN: NEGATIVE
CANNABINOIDS UR QL SCN: POSITIVE
COCAINE UR QL SCN: NEGATIVE
METHADONE UR QL SCN: NEGATIVE
OPIATES UR QL SCN: NEGATIVE
PCP UR QL: NEGATIVE
PH UR: 6.5 [PH] (ref 5–8)

## 2024-10-11 ENCOUNTER — DOCUMENTATION ONLY (OUTPATIENT)
Dept: OBSTETRICS AND GYNECOLOGY | Facility: CLINIC | Age: 34
End: 2024-10-11
Payer: MEDICAID

## 2024-10-14 ENCOUNTER — ROUTINE PRENATAL (OUTPATIENT)
Dept: OBSTETRICS AND GYNECOLOGY | Facility: CLINIC | Age: 34
End: 2024-10-14
Payer: MEDICAID

## 2024-10-14 VITALS
DIASTOLIC BLOOD PRESSURE: 62 MMHG | SYSTOLIC BLOOD PRESSURE: 110 MMHG | WEIGHT: 147 LBS | BODY MASS INDEX: 24.46 KG/M2 | HEART RATE: 98 BPM

## 2024-10-14 DIAGNOSIS — Z34.81 ENCOUNTER FOR SUPERVISION OF OTHER NORMAL PREGNANCY IN FIRST TRIMESTER: Primary | ICD-10-CM

## 2024-10-14 DIAGNOSIS — Z3A.13 13 WEEKS GESTATION OF PREGNANCY: ICD-10-CM

## 2024-10-14 LAB
BILIRUB UR QL STRIP: NEGATIVE
GLUCOSE UR QL STRIP: NEGATIVE
KETONES UR QL STRIP: NEGATIVE
LEUKOCYTE ESTERASE UR QL STRIP: NEGATIVE
PH, POC UA: 8.5
POC BLOOD, URINE: NEGATIVE
POC NITRATES, URINE: NEGATIVE
PROT UR QL STRIP: NEGATIVE
SP GR UR STRIP: 1.02 (ref 1–1.03)
UROBILINOGEN UR STRIP-ACNC: 0.2 (ref 0.1–1.1)

## 2024-10-14 NOTE — PROGRESS NOTES
HPI  George Cheney is a 33 y.o.  13w0d here for Routine Prenatal Visit (PT REPORTS NO PELVIC PAIN, BACK PAIN, OR  VAGINAL BLEEDING. SHE WOULD LIKE TO RETURN TO WORK)  .  Denies VB and pelvic pain.    GEORGE's allergies were reviewed and updated as appropriate.    Past Medical History:   Diagnosis Date    Abnormal Pap smear of cervix     Anxiety disorder, unspecified     Depression     HPV in female      Family History   Problem Relation Name Age of Onset    Cancer Maternal Grandmother      Cervical cancer Maternal Grandmother      Hypertension Father      Heart disease Father      Diabetes Mother       Social History     Tobacco Use    Smoking status: Every Day     Current packs/day: 0.50     Average packs/day: 0.5 packs/day for 10.0 years (5.0 ttl pk-yrs)     Types: Cigarettes     Passive exposure: Current    Smokeless tobacco: Never   Substance Use Topics    Alcohol use: Not Currently     Comment: SOCIAL    Drug use: Yes     Frequency: 7.0 times per week     Types: Marijuana     OB History    Para Term  AB Living   3 2 2     2   SAB IAB Ectopic Multiple Live Births           2      # Outcome Date GA Lbr Tab/2nd Weight Sex Type Anes PTL Lv   3 Current            2 Term 19    M Vag-Spont   JOAQUÍN   1 Term 14    M Vag-Spont   JOAQUÍN       Current Outpatient Medications:     cefdinir (OMNICEF) 300 MG capsule, Take 300 mg by mouth every 12 (twelve) hours., Disp: , Rfl:     ondansetron (ZOFRAN-ODT) 4 MG TbDL, Take 1 tablet (4 mg total) by mouth every 8 (eight) hours as needed (NAUSEA)., Disp: 30 tablet, Rfl: 1    sertraline (ZOLOFT) 25 MG tablet, Take 25 mg by mouth., Disp: , Rfl:     terconazole (TERAZOL 7) 0.4 % Crea, Place 1 applicator vaginally every evening., Disp: 45 g, Rfl: 0    ROS:  A comprehensive review of symptoms was completed and negative except as noted above.    Physical Exam:  Chaperone present for exam.    /62   Pulse 98   Wt 66.7 kg (147 lb)   LMP 2024 (Exact  "Date)   BMI 24.46 kg/m²     Gen: No distress.  Abdomen: Gravid, non-tender.  Pelvic:   Extremities: No edema.    Fetal Heart Rate: 152    Recent Results (from the past 24 hours)   POCT Urinalysis, Dipstick, Automated, W/O Scope    Collection Time: 10/14/24  1:41 PM   Result Value Ref Range    POC Blood, Urine Negative Negative    POC Bilirubin, Urine Negative Negative    POC Urobilinogen, Urine 0.2 0.1 - 1.1    POC Ketones, Urine Negative Negative    POC Protein, Urine Negative Negative    POC Nitrates, Urine Negative Negative    POC Glucose, Urine Negative Negative    pH, UA 8.5     POC Specific Gravity, Urine 1.020 1.003 - 1.029    POC Leukocytes, Urine Negative Negative     PRENATAL LABS:  ABO/Rh:   Lab Results   Component Value Date/Time    GROUPTRH O POS 09/26/2024 02:47 PM     H&H:  Lab Results   Component Value Date/Time    HGB 12.6 09/26/2024 02:47 PM    HCT 34.6 (L) 09/26/2024 02:47 PM     Platelet:  Lab Results   Component Value Date/Time     09/26/2024 02:47 PM     Osulivan: No results found for: "JHLH8DN", "UWHD5SN", "MCYO3YN"  HIV:   Lab Results   Component Value Date/Time    HIV Nonreactive 09/26/2024 02:47 PM     RPR:  Lab Results   Component Value Date/Time    SYPHAB Nonreactive 09/26/2024 02:47 PM     Hepatitis B Surface Antigen:  Lab Results   Component Value Date/Time    HEPBSAG Negative 09/26/2024 02:47 PM     Hepatitis C:  Lab Results   Component Value Date/Time    HEPCAB Nonreactive 09/26/2024 02:47 PM     Rubella Immune Status:  Lab Results   Component Value Date/Time    RUBELLAIGG 5.40 (L) 09/26/2024 02:47 PM     GBS:No results found for: "SREPBPCR", "STREPBCULT", "STREPONLY"   Last PAP Date: 3/27/2024    ASSESSMENT/PLAN:  1. Encounter for supervision of other normal pregnancy in first trimester  -     POCT Urinalysis, Dipstick, Automated, W/O Scope    2. 13 weeks gestation of pregnancy    PT MAY RETURN TO REG DUTY WED. INSTR PT IF BACK PAIN PELVIC PAIN OR VAGINAL BLEEDING TO CONTACT " OFFICE    Miscarriage precautions discussed with patient, as well as labor unit precautions.     Follow Up:  Follow up in about 3 weeks (around 11/4/2024) for SEX US.

## 2024-11-04 ENCOUNTER — PATIENT MESSAGE (OUTPATIENT)
Dept: OTHER | Facility: OTHER | Age: 34
End: 2024-11-04
Payer: MEDICAID

## 2024-11-05 ENCOUNTER — ROUTINE PRENATAL (OUTPATIENT)
Dept: OBSTETRICS AND GYNECOLOGY | Facility: CLINIC | Age: 34
End: 2024-11-05
Payer: MEDICAID

## 2024-11-05 VITALS
BODY MASS INDEX: 23.85 KG/M2 | HEART RATE: 85 BPM | WEIGHT: 143.31 LBS | DIASTOLIC BLOOD PRESSURE: 70 MMHG | SYSTOLIC BLOOD PRESSURE: 100 MMHG

## 2024-11-05 DIAGNOSIS — Z3A.16 16 WEEKS GESTATION OF PREGNANCY: ICD-10-CM

## 2024-11-05 DIAGNOSIS — O99.322 DRUG USE AFFECTING PREGNANCY IN SECOND TRIMESTER: ICD-10-CM

## 2024-11-05 DIAGNOSIS — O02.1 IUFD AT LESS THAN 20 WEEKS OF GESTATION: Primary | ICD-10-CM

## 2024-11-05 LAB
BILIRUB UR QL STRIP: NEGATIVE
GLUCOSE UR QL STRIP: NEGATIVE
KETONES UR QL STRIP: NEGATIVE
LEUKOCYTE ESTERASE UR QL STRIP: NEGATIVE
PH, POC UA: 7
POC BLOOD, URINE: NEGATIVE
POC NITRATES, URINE: NEGATIVE
PROT UR QL STRIP: NEGATIVE
SP GR UR STRIP: 1.02 (ref 1–1.03)
UROBILINOGEN UR STRIP-ACNC: 0.2 (ref 0.1–1.1)

## 2024-11-05 PROCEDURE — 82105 ALPHA-FETOPROTEIN SERUM: CPT | Performed by: OBSTETRICS & GYNECOLOGY

## 2024-11-05 PROCEDURE — 99214 OFFICE O/P EST MOD 30 MIN: CPT | Mod: TH,,, | Performed by: OBSTETRICS & GYNECOLOGY

## 2024-11-05 NOTE — PROGRESS NOTES
HPI  George Cheney is a 33 y.o.  16w1d here for ROUTINE OB F/U AND SEX U/S TODAY. NO C/O'S.  Denies VB and pelvic pain.  AFP DRAWN TODAY.     GEORGE's allergies were reviewed and updated as appropriate.    Past Medical History:   Diagnosis Date    Abnormal Pap smear of cervix     Anxiety disorder, unspecified     Depression     HPV in female      Family History   Problem Relation Name Age of Onset    Cancer Maternal Grandmother      Cervical cancer Maternal Grandmother      Hypertension Father      Heart disease Father      Diabetes Mother       Social History     Tobacco Use    Smoking status: Every Day     Current packs/day: 0.50     Average packs/day: 0.5 packs/day for 10.0 years (5.0 ttl pk-yrs)     Types: Cigarettes     Passive exposure: Current    Smokeless tobacco: Never   Substance Use Topics    Alcohol use: Not Currently     Comment: SOCIAL    Drug use: Yes     Frequency: 7.0 times per week     Types: Marijuana     OB History    Para Term  AB Living   3 2 2     2   SAB IAB Ectopic Multiple Live Births           2      # Outcome Date GA Lbr Tab/2nd Weight Sex Type Anes PTL Lv   3 Current            2 Term 19    M Vag-Spont   JOAQUÍN   1 Term 14    M Vag-Spont   JOAQUÍN       Current Outpatient Medications:     ondansetron (ZOFRAN-ODT) 4 MG TbDL, Take 1 tablet (4 mg total) by mouth every 8 (eight) hours as needed (NAUSEA)., Disp: 30 tablet, Rfl: 1    prenatal vit/iron fum/folic ac (PRENATAL 1+1 ORAL), Take by mouth., Disp: , Rfl:     ROS:  A comprehensive review of symptoms was completed and negative except as noted above.    Physical Exam:  Chaperone present for exam.    /70   Pulse 85   Wt 65 kg (143 lb 4.8 oz)   LMP 2024 (Exact Date)   BMI 23.85 kg/m²     Gen: No distress.  Abdomen: Gravid, non-tender.  Pelvic: DEFERRED  Extremities: No edema.    Fetal Heart Rate: 0  Movement: Absent  Presentation: Vertex    Recent Results (from the past 24 hours)   POCT  Urinalysis, Dipstick, Automated, W/O Scope    Collection Time: 11/05/24  4:21 PM   Result Value Ref Range    POC Blood, Urine Negative Negative    POC Bilirubin, Urine Negative Negative    POC Urobilinogen, Urine 0.2 0.1 - 1.1    POC Ketones, Urine Negative Negative    POC Protein, Urine Negative Negative    POC Nitrates, Urine Negative Negative    POC Glucose, Urine Negative Negative    pH, UA 7.0     POC Specific Gravity, Urine 1.025 1.003 - 1.029    POC Leukocytes, Urine Negative Negative     PRENATAL LABS:  ABO/Rh:   Lab Results   Component Value Date/Time    GROUPTRH O POS 09/26/2024 02:47 PM     H&H:  Lab Results   Component Value Date/Time    HGB 12.6 09/26/2024 02:47 PM    HCT 34.6 (L) 09/26/2024 02:47 PM     Platelet:  Lab Results   Component Value Date/Time     09/26/2024 02:47 PM     HIV:   Lab Results   Component Value Date/Time    HIV Nonreactive 09/26/2024 02:47 PM     RPR:  Lab Results   Component Value Date/Time    SYPHAB Nonreactive 09/26/2024 02:47 PM     Hepatitis B Surface Antigen:  Lab Results   Component Value Date/Time    HEPBSAG Negative 09/26/2024 02:47 PM     Hepatitis C:  Lab Results   Component Value Date/Time    HEPCAB Nonreactive 09/26/2024 02:47 PM     Rubella Immune Status:  Lab Results   Component Value Date/Time    RUBELLAIGG 5.40 (L) 09/26/2024 02:47 PM     Last PAP Date: 3/27/2024    ASSESSMENT/PLAN:  1. IUFD at less than 20 weeks of gestation  -     POCT Urinalysis, Dipstick, Automated, W/O Scope    2. 16 weeks gestation of pregnancy  -     [place] GENERIC ORDERABLE [email or web address]    3. Drug use affecting pregnancy in second trimester    ORDERS GIVEN FOR CONFIRMATION U/S AT [facility], ALONG WITH LABS.     Miscarriage precautions discussed with patient, as well as labor unit precautions.     Follow Up:  Follow up WILL CALL PATIENT WITH RESULTS AND F/U.     ADDENDUM:  U/S AT [facility] WAS CONSISTENT WITH DEMISE, MEASURING 15W2D, BREECH PRESENTATION, POSTERIOR PLACENTA.   WBC: 11K  H/H: 12/38  PLTS:  246K  CMP NL  COAGS NL    DISCUSSED ADMISSION FOR CYTOTEC INDUCTION BUT WILL HAVE TO WAIT UNTIL A BED IS AVAILABLE. PRECAUTIONS GIVEN, PT VERBALIZES UNDERSTANDING.

## 2024-11-06 ENCOUNTER — DOCUMENTATION ONLY (OUTPATIENT)
Dept: OBSTETRICS AND GYNECOLOGY | Facility: CLINIC | Age: 34
End: 2024-11-06
Payer: MEDICAID

## 2024-11-06 LAB — INR PPP: 0.9 (ref 2–3)

## 2024-11-07 ENCOUNTER — HOSPITAL ENCOUNTER (INPATIENT)
Facility: HOSPITAL | Age: 34
LOS: 1 days | Discharge: HOME | DRG: 779 | End: 2024-11-08
Attending: OBSTETRICS & GYNECOLOGY | Admitting: OBSTETRICS & GYNECOLOGY
Payer: MEDICAID

## 2024-11-07 ENCOUNTER — DOCUMENTATION ONLY (OUTPATIENT)
Dept: OBSTETRICS AND GYNECOLOGY | Facility: CLINIC | Age: 34
End: 2024-11-07
Payer: MEDICAID

## 2024-11-07 DIAGNOSIS — Z3A.15 15 WEEKS GESTATION OF PREGNANCY: ICD-10-CM

## 2024-11-07 DIAGNOSIS — O02.1 MISSED AB: Primary | ICD-10-CM

## 2024-11-07 PROBLEM — O99.332 MATERNAL TOBACCO USE IN SECOND TRIMESTER: Status: ACTIVE | Noted: 2024-11-07

## 2024-11-07 LAB
APTT PPP: 25.2 SECONDS (ref 23–29.4)
BASOPHILS # BLD AUTO: 0.03 X10(3)/MCL (ref 0.01–0.08)
BASOPHILS NFR BLD AUTO: 0.3 % (ref 0.1–1.2)
D DIMER PPP IA.FEU-MCNC: 0.62 MG/L (ref 0.19–0.5)
EOSINOPHIL # BLD AUTO: 0.24 X10(3)/MCL (ref 0.04–0.36)
EOSINOPHIL NFR BLD AUTO: 2.2 % (ref 0.7–7)
ERYTHROCYTE [DISTWIDTH] IN BLOOD BY AUTOMATED COUNT: 11.6 % (ref 11–14.5)
EST. AVERAGE GLUCOSE BLD GHB EST-MCNC: 108.3 MG/DL (ref 70–115)
FIBRINOGEN PPP-MCNC: 343 MG/DL (ref 210–463)
GROUP & RH: NORMAL
HBA1C MFR BLD: 5.4 % (ref 4–6)
HCT VFR BLD AUTO: 33.6 % (ref 36–48)
HGB BLD-MCNC: 11.7 G/DL (ref 11.8–16)
IMM GRANULOCYTES # BLD AUTO: 0.04 X10(3)/MCL (ref 0–0.03)
IMM GRANULOCYTES NFR BLD AUTO: 0.4 % (ref 0–0.5)
INDIRECT COOMBS: NORMAL
INR PPP: 0.9
LYMPHOCYTES # BLD AUTO: 3.63 X10(3)/MCL (ref 1.16–3.74)
LYMPHOCYTES NFR BLD AUTO: 33.3 % (ref 20–55)
MCH RBC QN AUTO: 30.9 PG (ref 27–34)
MCHC RBC AUTO-ENTMCNC: 34.8 G/DL (ref 31–37)
MCV RBC AUTO: 88.7 FL (ref 79–99)
MONOCYTES # BLD AUTO: 0.88 X10(3)/MCL (ref 0.24–0.36)
MONOCYTES NFR BLD AUTO: 8.1 % (ref 4.7–12.5)
NEUTROPHILS # BLD AUTO: 6.07 X10(3)/MCL (ref 1.56–6.13)
NEUTROPHILS NFR BLD AUTO: 55.7 % (ref 37–73)
NRBC BLD AUTO-RTO: 0 %
PLATELET # BLD AUTO: 219 X10(3)/MCL (ref 140–371)
PMV BLD AUTO: 11 FL (ref 9.4–12.4)
PROTHROMBIN TIME: 9.5 SECONDS (ref 9.3–11.9)
RBC # BLD AUTO: 3.79 X10(6)/MCL (ref 4–5.1)
RUBELLA AB, IGG (OLG): 5.33 IU/ML
SPECIMEN OUTDATE: NORMAL
TSH SERPL-ACNC: 1.02 UIU/ML (ref 0.36–3.74)
TT IMM BOVINE THROMBIN PPP: 34 SECONDS (ref 0–22)
WBC # BLD AUTO: 10.89 X10(3)/MCL (ref 4–11.5)

## 2024-11-07 PROCEDURE — 85613 RUSSELL VIPER VENOM DILUTED: CPT | Performed by: OBSTETRICS & GYNECOLOGY

## 2024-11-07 PROCEDURE — 83036 HEMOGLOBIN GLYCOSYLATED A1C: CPT | Performed by: OBSTETRICS & GYNECOLOGY

## 2024-11-07 PROCEDURE — 25000003 PHARM REV CODE 250: Performed by: OBSTETRICS & GYNECOLOGY

## 2024-11-07 PROCEDURE — 86146 BETA-2 GLYCOPROTEIN ANTIBODY: CPT | Performed by: OBSTETRICS & GYNECOLOGY

## 2024-11-07 PROCEDURE — 85670 THROMBIN TIME PLASMA: CPT | Performed by: OBSTETRICS & GYNECOLOGY

## 2024-11-07 PROCEDURE — 85300 ANTITHROMBIN III ACTIVITY: CPT | Performed by: OBSTETRICS & GYNECOLOGY

## 2024-11-07 PROCEDURE — 36415 COLL VENOUS BLD VENIPUNCTURE: CPT | Performed by: OBSTETRICS & GYNECOLOGY

## 2024-11-07 PROCEDURE — 63600175 PHARM REV CODE 636 W HCPCS: Performed by: OBSTETRICS & GYNECOLOGY

## 2024-11-07 PROCEDURE — 11000001 HC ACUTE MED/SURG PRIVATE ROOM

## 2024-11-07 PROCEDURE — 86762 RUBELLA ANTIBODY: CPT | Performed by: OBSTETRICS & GYNECOLOGY

## 2024-11-07 PROCEDURE — 85384 FIBRINOGEN ACTIVITY: CPT | Performed by: OBSTETRICS & GYNECOLOGY

## 2024-11-07 PROCEDURE — 85379 FIBRIN DEGRADATION QUANT: CPT | Performed by: OBSTETRICS & GYNECOLOGY

## 2024-11-07 PROCEDURE — 86147 CARDIOLIPIN ANTIBODY EA IG: CPT | Performed by: OBSTETRICS & GYNECOLOGY

## 2024-11-07 PROCEDURE — 85730 THROMBOPLASTIN TIME PARTIAL: CPT | Performed by: OBSTETRICS & GYNECOLOGY

## 2024-11-07 PROCEDURE — 85025 COMPLETE CBC W/AUTO DIFF WBC: CPT | Performed by: OBSTETRICS & GYNECOLOGY

## 2024-11-07 PROCEDURE — 86777 TOXOPLASMA ANTIBODY: CPT | Performed by: OBSTETRICS & GYNECOLOGY

## 2024-11-07 PROCEDURE — 84443 ASSAY THYROID STIM HORMONE: CPT | Performed by: OBSTETRICS & GYNECOLOGY

## 2024-11-07 PROCEDURE — 86901 BLOOD TYPING SEROLOGIC RH(D): CPT | Performed by: OBSTETRICS & GYNECOLOGY

## 2024-11-07 PROCEDURE — 85610 PROTHROMBIN TIME: CPT | Performed by: OBSTETRICS & GYNECOLOGY

## 2024-11-07 PROCEDURE — 86747 PARVOVIRUS ANTIBODY: CPT | Performed by: OBSTETRICS & GYNECOLOGY

## 2024-11-07 PROCEDURE — 86778 TOXOPLASMA ANTIBODY IGM: CPT

## 2024-11-07 PROCEDURE — 86644 CMV ANTIBODY: CPT | Performed by: OBSTETRICS & GYNECOLOGY

## 2024-11-07 RX ORDER — HYDROCODONE BITARTRATE AND ACETAMINOPHEN 5; 325 MG/1; MG/1
1 TABLET ORAL EVERY 6 HOURS PRN
Status: DISCONTINUED | OUTPATIENT
Start: 2024-11-07 | End: 2024-11-08 | Stop reason: HOSPADM

## 2024-11-07 RX ORDER — METHYLERGONOVINE MALEATE 0.2 MG/ML
200 INJECTION INTRAVENOUS ONCE AS NEEDED
Status: DISCONTINUED | OUTPATIENT
Start: 2024-11-07 | End: 2024-11-08 | Stop reason: HOSPADM

## 2024-11-07 RX ORDER — BUTORPHANOL TARTRATE 2 MG/ML
1 INJECTION INTRAMUSCULAR; INTRAVENOUS
Status: DISCONTINUED | OUTPATIENT
Start: 2024-11-07 | End: 2024-11-08 | Stop reason: HOSPADM

## 2024-11-07 RX ORDER — MISOPROSTOL 100 UG/1
400 TABLET ORAL EVERY 4 HOURS PRN
Status: DISCONTINUED | OUTPATIENT
Start: 2024-11-07 | End: 2024-11-08 | Stop reason: HOSPADM

## 2024-11-07 RX ORDER — IBUPROFEN 400 MG/1
400 TABLET ORAL EVERY 6 HOURS PRN
Status: DISCONTINUED | OUTPATIENT
Start: 2024-11-07 | End: 2024-11-08 | Stop reason: HOSPADM

## 2024-11-07 RX ADMIN — MISOPROSTOL 400 MCG: 100 TABLET ORAL at 07:11

## 2024-11-07 RX ADMIN — BUTORPHANOL TARTRATE 1 MG: 2 INJECTION, SOLUTION INTRAMUSCULAR; INTRAVENOUS at 11:11

## 2024-11-07 RX ADMIN — MISOPROSTOL 400 MCG: 100 TABLET ORAL at 03:11

## 2024-11-07 RX ADMIN — MISOPROSTOL 400 MCG: 100 TABLET ORAL at 11:11

## 2024-11-07 RX ADMIN — HYDROCODONE BITARTRATE AND ACETAMINOPHEN 1 TABLET: 5; 325 TABLET ORAL at 07:11

## 2024-11-07 RX ADMIN — IBUPROFEN 400 MG: 400 TABLET, FILM COATED ORAL at 07:11

## 2024-11-07 NOTE — LETTER
November 8, 2024         [address]  [place] [address]  Phone: [phone]       Date of Visit: 11/08/2024    To Whom It May Concern:    Please be advised that under state and federal laws as it relates to patient privacy and Health Insurance Accountability Act (HIPAA), we can not release our patient(s) name without authorization. Although, we can confirm that the individual listed below did accompany a person to our facility for healthcare services to be provided.    This document confirms that Nuno Guotenot accompanied a patient to our facility on 11/7/2024-11/08/2024.    Sincerely,     Shahana Akbar RN

## 2024-11-07 NOTE — H&P
[facility]  Obstetrics  History & Physical    Patient Name: Eddie Cheney  MRN: [id number]  Admission Date: 2024  Primary Care Provider: Aleksandar William III, APRN-CNP    Subjective:     Principal Problem:IUFD at less than 20 weeks of gestation    History of Present Illness:  33 y.o. female  at 16w3d presented for induction due to missed .  Patient was diagnosed with a missed A/B [facility] earlier this week.  Ultrasound upon arrival to the hospital at her request was performed also confirming proximally 15 week fetus with no fetal heart tones.  Denies fever, pain, bleeding, discharge with odor.        OB History    Para Term  AB Living   3 2 2 0 0 2   SAB IAB Ectopic Multiple Live Births   0 0 0 0 2      # Outcome Date GA Lbr Tab/2nd Weight Sex Type Anes PTL Lv   3 Current            2 Term 19    M Vag-Spont   JOAQUÍN   1 Term 14    M Vag-Spont   JOAQUÍN     Past Medical History:   Diagnosis Date    Abnormal Pap smear of cervix     Anxiety disorder, unspecified     Depression     HPV in female      Past Surgical History:   Procedure Laterality Date    ANKLE FRACTURE SURGERY Right     COLPOSCOPY      RIGHT HAND FRACTURE Right     WISDOM TOOTH EXTRACTION         PTA Medications   Medication Sig    ondansetron (ZOFRAN-ODT) 4 MG TbDL Take 1 tablet (4 mg total) by mouth every 8 (eight) hours as needed (NAUSEA).    prenatal vit/iron fum/folic ac (PRENATAL 1+1 ORAL) Take by mouth.       Review of patient's allergies indicates:   Allergen Reactions    Tramadol         Family History       Problem Relation (Age of Onset)    Cancer Maternal Grandmother    Cervical cancer Maternal Grandmother    Diabetes Mother    Heart disease Father    Hypertension Father          Tobacco Use    Smoking status: Every Day     Current packs/day: 0.50     Average packs/day: 0.5 packs/day for 10.0 years (5.0 ttl pk-yrs)     Types: Cigarettes     Passive exposure: Current     Smokeless tobacco: Never   Substance and Sexual Activity    Alcohol use: Not Currently     Comment: SOCIAL    Drug use: Yes     Frequency: 7.0 times per week     Types: Marijuana    Sexual activity: Not Currently     Partners: Male     Birth control/protection: None     Review of Systems   All other systems reviewed and are negative.     Objective:     Vital Signs (Most Recent):  Temp: 97.9 °F (36.6 °C) (11/07/24 1530)  Pulse: 74 (11/07/24 1530)  Resp: 18 (11/07/24 1530)  BP: 107/62 (11/07/24 1530) Vital Signs (24h Range):  Temp:  [97.9 °F (36.6 °C)-98.6 °F (37 °C)] 97.9 °F (36.6 °C)  Pulse:  [] 74  Resp:  [18-20] 18  BP: (107-121)/(62-83) 107/62        There is no height or weight on file to calculate BMI.     Physical Exam:   Constitutional: She is oriented to person, place, and time. She appears well-developed and well-nourished. No distress.    HENT:   Head: Normocephalic and atraumatic.    Eyes: Pupils are equal, round, and reactive to light. Conjunctivae and EOM are normal.     Cardiovascular:  Normal rate.      Exam reveals no clubbing, no cyanosis and no edema.        Pulmonary/Chest: Effort normal. No respiratory distress. She has no wheezes.        Abdominal: Soft. She exhibits no distension. There is no abdominal tenderness. There is no rebound and no guarding.                 Neurological: She is alert and oriented to person, place, and time.    Skin: Skin is warm and dry. She is not diaphoretic. No cyanosis. Nails show no clubbing.    Psychiatric: She has a normal mood and affect. Her behavior is normal. Judgment and thought content normal.       Significant Labs:  Lab Results   Component Value Date    GROUPTRH O POS 09/26/2024    HEPBSAG Negative 09/26/2024       I have personallly reviewed all pertinent lab results from the last 24 hours.  Recent Lab Results         11/07/24  1659   11/07/24  1538        PTT   25.2  Comment: For Minimal Heparin Infusion, the goal aPTT 64-85 seconds corresponds  to an anti-Xa of 0.3-0.5.    For Low Intensity and High Intensity Heparin, the goal aPTT  seconds corresponds to an anti-Xa of 0.3-0.7       Baso # 0.03         Basophil % 0.3         D-Dimer   0.62  Comment: D-dimer values of less than 0.5ug/mL FEU in adult patients with a clinically low pre-test probability of developing a DVT yield  a 99% negative predictive value.  D-dimer increases naturally with age, therefore the negative predictive value in patients older than 80 is 21 - 31%.    D-Dimer testing at [facility], [facility], and [facility] is used as an aid in the diagnosis of VTE/PE. The D-Dimer test must be used with one or more additional tests such as imaging studies to evaluate VTE/PE       Eos # 0.24         Eos % 2.2         Estimated Avg Glucose 108.3         Hematocrit 33.6         Hemoglobin 11.7         Hemoglobin A1C External 5.4         Immature Grans (Abs) 0.04         Immature Granulocytes 0.4         INR   0.9       Lymph # 3.63         LYMPH % 33.3         MCH 30.9         MCHC 34.8         MCV 88.7         Mono # 0.88         Mono % 8.1         MPV 11.0         Neut # 6.07         Neut % 55.7         nRBC 0.0         Platelet Count 219         PT   9.5       RBC 3.79         RDW 11.6         Rubella IgG Antibodies   5.33       TSH   1.020       WBC 10.89               Assessment/Plan:     33 y.o. female  at 16w3d for:  Active Hospital Problems    Diagnosis  POA    *IUFD at less than 20 weeks of gestation [O02.1]  Yes    15 weeks gestation of pregnancy [Z3A.15]  Not Applicable    Maternal tobacco use in second trimester [O99.332]  Yes      Resolved Hospital Problems   No resolved problems to display.     Discussed the ultrasound findings and Diagnostic diagnosis of missed  with the patient.  Discussed antiphospholipid antibody syndrome testing as well as other lab work to assess potential cause of demise.    Discussed option  for genetic testing after delivery.  No  Cytotec induction per [facility] protocol.    IV access.    Pain control as needed.    Anticipate vaginal      CHARU BOYLE MD  Obstetrics  [facility]

## 2024-11-07 NOTE — LETTER
November 8, 2024         [address]  [place] [address]  Phone: [phone]       Date of Visit: 11/08/2024    To Whom It May Concern:    Please be advised that under state and federal laws as it relates to patient privacy and Health Insurance Accountability Act (HIPAA), we can not release our patient(s) name without authorization. Although, we can confirm that the individual listed below did accompany a person to our facility for healthcare services to be provided.    This document confirms that Nuno Sood accompanied a patient to our facility on 11/08/2024    Sincerely,     Shahana Akbar RN

## 2024-11-07 NOTE — LETTER
November 8, 2024         [address] [address]  Phone: [phone]       Patient: Eddie Cheney   YOB: 1990  Date of Visit: 11/08/2024    To Whom It May Concern:    TAMERA Cheney  was at [facility] on 11/08/2024. The patient may return to work/school on 11/7-11/8/24 with restrictions. If you have any questions or concerns, or if I can be of further assistance, please do not hesitate to contact me.    Sincerely,    Shahana Akbar RN

## 2024-11-07 NOTE — LETTER
November 8, 2024         [address]  [place] [address]  Phone: [phone]       Patient: Eddie Cheney   YOB: 1990  Date of Visit: 11/08/2024    To Whom It May Concern:    TAMERA Cheney  was at [facility] on 11/07/2024- 11/08/2024. The patient may return to work/school on  with restrictions. If you have any questions or concerns, or if I can be of further assistance, please do not hesitate to contact me.    Sincerely,    Shahana Akbar RN

## 2024-11-07 NOTE — SUBJECTIVE & OBJECTIVE
OB History    Para Term  AB Living   3 2 2 0 0 2   SAB IAB Ectopic Multiple Live Births   0 0 0 0 2      # Outcome Date GA Lbr Tab/2nd Weight Sex Type Anes PTL Lv   3 Current            2 Term 19    M Vag-Spont   JOAQUÍN   1 Term 14    M Vag-Spont   JOAQUÍN     Past Medical History:   Diagnosis Date    Abnormal Pap smear of cervix     Anxiety disorder, unspecified     Depression     HPV in female      Past Surgical History:   Procedure Laterality Date    ANKLE FRACTURE SURGERY Right     COLPOSCOPY      RIGHT HAND FRACTURE Right     WISDOM TOOTH EXTRACTION         PTA Medications   Medication Sig    ondansetron (ZOFRAN-ODT) 4 MG TbDL Take 1 tablet (4 mg total) by mouth every 8 (eight) hours as needed (NAUSEA).    prenatal vit/iron fum/folic ac (PRENATAL 1+1 ORAL) Take by mouth.       Review of patient's allergies indicates:   Allergen Reactions    Tramadol         Family History       Problem Relation (Age of Onset)    Cancer Maternal Grandmother    Cervical cancer Maternal Grandmother    Diabetes Mother    Heart disease Father    Hypertension Father          Tobacco Use    Smoking status: Every Day     Current packs/day: 0.50     Average packs/day: 0.5 packs/day for 10.0 years (5.0 ttl pk-yrs)     Types: Cigarettes     Passive exposure: Current    Smokeless tobacco: Never   Substance and Sexual Activity    Alcohol use: Not Currently     Comment: SOCIAL    Drug use: Yes     Frequency: 7.0 times per week     Types: Marijuana    Sexual activity: Not Currently     Partners: Male     Birth control/protection: None     Review of Systems   All other systems reviewed and are negative.     Objective:     Vital Signs (Most Recent):  Temp: 97.9 °F (36.6 °C) (24 1530)  Pulse: 74 (24 1530)  Resp: 18 (24 1530)  BP: 107/62 (24 1530) Vital Signs (24h Range):  Temp:  [97.9 °F (36.6 °C)-98.6 °F (37 °C)] 97.9 °F (36.6 °C)  Pulse:  [] 74  Resp:  [18-20] 18  BP: (107-121)/(62-83) 107/62         There is no height or weight on file to calculate BMI.     Physical Exam:   Constitutional: She is oriented to person, place, and time. She appears well-developed and well-nourished. No distress.    HENT:   Head: Normocephalic and atraumatic.    Eyes: Pupils are equal, round, and reactive to light. Conjunctivae and EOM are normal.     Cardiovascular:  Normal rate.      Exam reveals no clubbing, no cyanosis and no edema.        Pulmonary/Chest: Effort normal. No respiratory distress. She has no wheezes.        Abdominal: Soft. She exhibits no distension. There is no abdominal tenderness. There is no rebound and no guarding.                 Neurological: She is alert and oriented to person, place, and time.    Skin: Skin is warm and dry. She is not diaphoretic. No cyanosis. Nails show no clubbing.    Psychiatric: She has a normal mood and affect. Her behavior is normal. Judgment and thought content normal.       Significant Labs:  Lab Results   Component Value Date    GROUPTRH O POS 09/26/2024    HEPBSAG Negative 09/26/2024       I have personallly reviewed all pertinent lab results from the last 24 hours.  Recent Lab Results         11/07/24  1659   11/07/24  1538        PTT   25.2  Comment: For Minimal Heparin Infusion, the goal aPTT 64-85 seconds corresponds to an anti-Xa of 0.3-0.5.    For Low Intensity and High Intensity Heparin, the goal aPTT  seconds corresponds to an anti-Xa of 0.3-0.7       Baso # 0.03         Basophil % 0.3         D-Dimer   0.62  Comment: D-dimer values of less than 0.5ug/mL FEU in adult patients with a clinically low pre-test probability of developing a DVT yield  a 99% negative predictive value.  D-dimer increases naturally with age, therefore the negative predictive value in patients older than 80 is 21 - 31%.    D-Dimer testing at [facility], [facility], and [facility] is used as an aid in the diagnosis of VTE/PE. The  D-Dimer test must be used with one or more additional tests such as imaging studies to evaluate VTE/PE       Eos # 0.24         Eos % 2.2         Estimated Avg Glucose 108.3         Hematocrit 33.6         Hemoglobin 11.7         Hemoglobin A1C External 5.4         Immature Grans (Abs) 0.04         Immature Granulocytes 0.4         INR   0.9       Lymph # 3.63         LYMPH % 33.3         MCH 30.9         MCHC 34.8         MCV 88.7         Mono # 0.88         Mono % 8.1         MPV 11.0         Neut # 6.07         Neut % 55.7         nRBC 0.0         Platelet Count 219         PT   9.5       RBC 3.79         RDW 11.6         Rubella IgG Antibodies   5.33       TSH   1.020       WBC 10.89

## 2024-11-08 VITALS
OXYGEN SATURATION: 98 % | HEIGHT: 65 IN | TEMPERATURE: 98 F | DIASTOLIC BLOOD PRESSURE: 70 MMHG | SYSTOLIC BLOOD PRESSURE: 105 MMHG | RESPIRATION RATE: 18 BRPM | HEART RATE: 64 BPM | BODY MASS INDEX: 23.88 KG/M2 | WEIGHT: 143.31 LBS

## 2024-11-08 LAB — MAYO GENERIC ORDERABLE RESULT: NORMAL

## 2024-11-08 PROCEDURE — 99222 1ST HOSP IP/OBS MODERATE 55: CPT | Mod: ,,, | Performed by: OBSTETRICS & GYNECOLOGY

## 2024-11-08 PROCEDURE — 25000003 PHARM REV CODE 250: Performed by: OBSTETRICS & GYNECOLOGY

## 2024-11-08 PROCEDURE — 72200005 HC VAGINAL DELIVERY LEVEL II

## 2024-11-08 RX ORDER — IBUPROFEN 600 MG/1
600 TABLET ORAL EVERY 6 HOURS PRN
Qty: 120 TABLET | Refills: 2 | Status: SHIPPED | OUTPATIENT
Start: 2024-11-08

## 2024-11-08 RX ADMIN — IBUPROFEN 400 MG: 400 TABLET, FILM COATED ORAL at 01:11

## 2024-11-08 RX ADMIN — HYDROCODONE BITARTRATE AND ACETAMINOPHEN 1 TABLET: 5; 325 TABLET ORAL at 08:11

## 2024-11-08 NOTE — NURSING
home representative present to  baby. Family in room. Representative left unit with baby in carrier basket.

## 2024-11-08 NOTE — PLAN OF CARE
24 0945   OB SCREEN   Assessment Type Discharge Planning Assessment   Source of Information patient   Received Prenatal Care Yes   Any indications/suspicions for None   Is this a teen pregnancy No   Is the baby in NICU No   Indication for adoption/Safe Haven No   Indication for DME/post-acute needs No   HIV (+) No   Any congenital  disorders No   Fetal demise/ death Yes   Physical Activity   On average, how many days per week do you engage in moderate to strenuous exercise (like a brisk walk)? 0 days   On average, how many minutes do you engage in exercise at this level? 0 min   Financial Resource Strain   How hard is it for you to pay for the very basics like food, housing, medical care, and heating? Not very   Housing Stability   In the last 12 months, was there a time when you were not able to pay the mortgage or rent on time? N   At any time in the past 12 months, were you homeless or living in a shelter (including now)? N   Transportation Needs   Has the lack of transportation kept you from medical appointments, meetings, work or from getting things needed for daily living? No   Food Insecurity   Within the past 12 months, you worried that your food would run out before you got the money to buy more. Never true   Within the past 12 months, the food you bought just didn't last and you didn't have money to get more. Never true   Stress   Do you feel stress - tense, restless, nervous, or anxious, or unable to sleep at night because your mind is troubled all the time - these days? To some exte   Social Isolation   How often do you feel lonely or isolated from those around you?  Never   Alcohol Use   Q1: How often do you have a drink containing alcohol? Never   Q2: How many drinks containing alcohol do you have on a typical day when you are drinking? None   Q3: How often do you have six or more drinks on one occasion? Never   Utilities   In the past 12 months has the electric, gas, oil, or  water company threatened to shut off services in your home? No   Health Literacy   How often do you need to have someone help you when you read instructions, pamphlets, or other written material from your doctor or pharmacy? Never     Spoke with patient regarding [facility]. She would like to have referral sent in. [facility] home chosen per family. Patient states she has no other needs at this time

## 2024-11-08 NOTE — L&D DELIVERY NOTE
"[facility]  Vaginal Delivery   Operative Note    SUMMARY     33 y.o. female  at 16w3d presented for induction due to fetal demise.  Labs pending.  Demise was confirmed by ultrasound on arrival.  The patient was given Cytotec overnight for induction.  She delivered 15 week demised fetus at 11:45 a.m..  Placenta delivered at midnight 33.  Bleeding was minimal.  No lacerations.  A piece of placenta was obtained for genetic testing.  Mother was allowed time with the baby.  Supportive materials were given.    Patient tolerated delivery well. Sponge needle and lap counted correctly x2.    Indications: IUFD at less than 20 weeks of gestation  Pregnancy complicated by:   Patient Active Problem List   Diagnosis    IUFD at less than 20 weeks of gestation    15 weeks gestation of pregnancy    Maternal tobacco use in second trimester    Normal vaginal delivery     Admitting GA: 16w3d    Delivery Information for Girl Eddie Cheney    Birth information:  YOB: 2025   Time of birth: 11:45 PM   Sex: female   Head Delivery Date/Time: 2024 11:45 PM   Delivery type: Vaginal, Spontaneous   Gestational Age: 16w3d       Delivery Providers    Delivering clinician: Lane Almonte MD   Provider Role    Rita Gorman RN Registered Nurse    Aimee Barahona RN Registered Nurse    Gregg Lancaster LPN Registered Nurse              Measurements    Weight: 83 g  Weight (lbs): 2.9 oz  Length: 14 cm  Length (in): 5.5"         Apgars    Living status: Fetal Demise  Apgar Component Scores:  1 min.:  5 min.:  10 min.:  15 min.:  20 min.:    Skin color:  0  0  0  0  0    Heart rate:  0  0  0  0  0    Reflex irritability:  0  0  0  0  0    Muscle tone:  0  0  0  0  0    Respiratory effort:  0  0  0  0  0    Total:  0  0  0  0  0    Apgars assigned by: ESTUARDO GORMAN RN         Operative Delivery    Forceps attempted?: No  Vacuum extractor attempted?: No         Shoulder Dystocia  "   Shoulder dystocia present?: No                 Interventions/Resuscitation           Cord    Vessels: 3 vessels  Complications: None  Delayed Cord Clamping?: No  Cord Blood Disposition: Discarded  Gases Sent?: No       Placenta    Placenta delivery date/time: 2024 0033  Placenta removal: Spontaneous  Placenta appearance: Intact  Placenta disposition: Discarded           Labor Events:       labor: No     Labor Onset Date/Time: 2024 15:23     Dilation Complete Date/Time: 2024 23:43     Start Pushing Date/Time: 2024 23:44     Rupture Date/Time:            Rupture type:          Fluid Amount:       Fluid Color:        steroids: None     Antibiotics given for GBS: No     Induction: misoprostol     Indications for induction:  Fetal Demise     Augmentation:       Indications for augmentation:       Labor complications: None     Additional complications:          Cervical ripening:                     Delivery:      Episiotomy: None     Indication for Episiotomy:       Perineal Lacerations: None Repaired:      Periurethral Laceration:   Repaired:     Labial Laceration:   Repaired:     Sulcus Laceration:   Repaired:     Vaginal Laceration:   Repaired:     Cervical Laceration:   Repaired:     Repair suture: None     Repair # of packets:       Last Value - EBL - Nursing (mL):       Sum - EBL - Nursing (mL): 0     Last Value - EBL - Anesthesia (mL):      Calculated QBL (mL): 150     Running total QBL (mL): 150     Vaginal Sweep Performed: No     Surgicount Correct: Yes       Other providers:       Anesthesia    Method: None          Details (if applicable):  Trial of Labor      Categorization:      Priority:     Indications for :     Incision Type:       Additional  information:  Forceps:    Vacuum:    Breech:    Observed anomalies    Other (Comments):

## 2024-11-08 NOTE — NURSING
Mother ready for  home to .   Notified [facility].  Father requests to be present when  picking up.

## 2024-11-08 NOTE — NURSING
Mother request baby be removed from room until her mother is here.  Baby removed, placed in nursery.

## 2024-11-08 NOTE — NURSING
Mother requested baby be brought back into room.  Linens and ice refreshed.  Presented baby to mother and family, offered support, would like privacy at this time.

## 2024-11-08 NOTE — DISCHARGE SUMMARY
"[facility]  Obstetrics  Discharge Summary      Patient Name: Eddie Cheney  MRN: [id number]  Admission Date: 2024  Hospital Length of Stay: 1 days  Discharge Date and Time:  2024 12:58 PM  Attending Physician: Chan Charlton MD   Discharging Provider: CHARU BOYLE MD   Primary Care Provider: Aleksandar William III, APRN-CNP    HPI: 33 y.o. female  at 16w3d presented for induction due to missed .  Patient was diagnosed with a missed A/B [facility] earlier this week.  Ultrasound upon arrival to the hospital at her request was performed also confirming proximally 15 week fetus with no fetal heart tones.  Denies fever, pain, bleeding, discharge with odor.      Hospital Course:   Delivery was uncomplicated.  Patient was allowed time with the baby.  Piece of the placenta was obtained and sent for genetic testing.  Mother did well postoperatively and desired discharge home today.         Final Active Diagnoses:    Diagnosis Date Noted POA    PRINCIPAL PROBLEM:  IUFD at less than 20 weeks of gestation [O02.1] 2024 Yes    Normal vaginal delivery [O80] 2024 Not Applicable    15 weeks gestation of pregnancy [Z3A.15] 2024 Not Applicable    Maternal tobacco use in second trimester [O99.332] 2024 Yes      Problems Resolved During this Admission:        Significant Diagnostic Studies: Labs: CMP No results for input(s): "NA", "K", "CL", "CO2", "GLU", "BUN", "CREATININE", "CALCIUM", "PROT", "ALBUMIN", "BILITOT", "ALKPHOS", "AST", "ALT", "ANIONGAP", "ESTGFRAFRICA", "EGFRNONAA" in the last 48 hours., CBC   Recent Labs   Lab 24  1659   WBC 10.89   HGB 11.7*   HCT 33.6*      , and All labs within the past 24 hours have been reviewed    Delivery:    Episiotomy: None   Lacerations: None   Repair suture: None   Repair # of packets:     Blood loss (ml):       Birth information:  YOB: 2025   Time of birth: 11:45 PM   Sex: female " "  Delivery type: Vaginal, Spontaneous   Gestational Age: 16w3d     Measurements    Weight: 83 g  Weight (lbs): 2.9 oz  Length: 14 cm  Length (in): 5.5"         Delivery Clinician: Delivery Providers    Delivering clinician: Lane Almonte MD   Provider Role    Rita Gorman RN Registered Nurse    Aimee Barahona RN Registered Nurse    Gregg Lancaster, LPN Registered Nurse             Additional  information:  Forceps:    Vacuum:    Breech:    Observed anomalies      Living?:     Apgars    Living status: Fetal Demise  Apgar Component Scores:  1 min.:  5 min.:  10 min.:  15 min.:  20 min.:    Skin color:  0  0  0  0  0    Heart rate:  0  0  0  0  0    Reflex irritability:  0  0  0  0  0    Muscle tone:  0  0  0  0  0    Respiratory effort:  0  0  0  0  0    Total:  0  0  0  0  0    Apgars assigned by: ESTUARDO GORMAN RN         Placenta: Delivered:       appearance  Pending Diagnostic Studies:       Procedure Component Value Units Date/Time    Antithrombin III [[id number]] Collected: 24    Order Status: Sent Lab Status: In process Updated: 24    Specimen: Blood     Beta-2 Glycoprotein I Ab (IgG/IgM) [[id number]] Collected: 24    Order Status: Sent Lab Status: In process Updated: 24    Specimen: Blood     CARDIOLIPIN AB (IGA,IGG,IGM) [[id number]] Collected: 24    Order Status: Sent Lab Status: In process Updated: 24    Specimen: Blood     Cytomegalovirus antibody, IgG [[id number]] Collected: 24    Order Status: Sent Lab Status: In process Updated: 24    Specimen: Blood     DRVVT [[id number]] Collected: 24    Order Status: Sent Lab Status: In process Updated: 24    Specimen: Blood     Fetal Karyotype [[id number]]     Order Status: Sent Lab Status: No result     Specimen: Amniotic Fluid     Lupus Anticoagulant [[id number]]  (Abnormal) Collected: 24    Order Status: Sent Lab Status: In process " Updated: 11/07/24 2230    Specimen: Blood     Narrative:      The following orders were created for panel order Lupus Anticoagulant.  Procedure                               Abnormality         Status                     ---------                               -----------         ------                     Lupus Anticoagulant Panel[[id number]]                       In process                 Thrombin Time[[id number]]               Abnormal            Final result                 Please view results for these tests on the individual orders.    Lupus Anticoagulant Panel [[id number]] Collected: 11/07/24 1538    Order Status: Sent Lab Status: In process Updated: 11/07/24 1538    Specimen: Blood     [place] GENERIC ORDERABLE [[id number]] Collected: 11/07/24 1538    Order Status: Sent Lab Status: In process Updated: 11/08/24 0650    Specimen: Other     [place] GENERIC ORDERABLE [facility] [[id number]] Collected: 11/08/24 0559    Order Status: Sent Lab Status: In process Updated: 11/08/24 1114    Specimen: Other from Placenta     Parvovirus B19 antibody, IgG and IgM [[id number]] Collected: 11/07/24 1538    Order Status: Sent Lab Status: In process Updated: 11/07/24 1538    Specimen: Blood     Specimen to Pathology Gynecology and Obstetrics [[id number]] Collected: 11/08/24 1111    Order Status: Sent Lab Status: No result     Specimen: Tissue     Specimen to Pathology Gynecology and Obstetrics [[id number]] Collected: 11/08/24 0559    Order Status: Sent Lab Status: No result     Specimen: Tissue             Discharged Condition: good    Disposition: Home or Self Care    Follow Up:   Follow-up Information       Chan Charlton MD Follow up in 1 week(s).    Specialty: Obstetrics and Gynecology  Contact information:  [address] [place] [id number]  [phone]               Chan Charlton MD .    Specialty: Obstetrics and Gynecology  Contact information:  [address] [place] [id number]  [phone]                            Patient Instructions:   No discharge procedures on file.  Medications:  Current Discharge Medication List        START taking these medications    Details   ibuprofen (ADVIL,MOTRIN) 600 MG tablet Take 1 tablet (600 mg total) by mouth every 6 (six) hours as needed.  Qty: 120 tablet, Refills: 2           CONTINUE these medications which have NOT CHANGED    Details   ondansetron (ZOFRAN-ODT) 4 MG TbDL Take 1 tablet (4 mg total) by mouth every 8 (eight) hours as needed (NAUSEA).  Qty: 30 tablet, Refills: 1    Associated Diagnoses: Nausea and vomiting, unspecified vomiting type      prenatal vit/iron fum/folic ac (PRENATAL 1+1 ORAL) Take by mouth.         No lifting more than 20 lbs and no intercourse for 6 weeks.  Sitz baths BID.  Pain, fever, bleeding, pre-eclampsia, thromboembolism, and postpartum depression precautions are given.   Supportive materials were provided  Baby was picked up by the patient's choice a  home      CHARU BOYLE MD  Obstetrics  [facility]

## 2024-11-08 NOTE — HOSPITAL COURSE
Delivery was uncomplicated.  Patient was allowed time with the baby.  Piece of the placenta was obtained and sent for genetic testing.  Mother did well postoperatively and desired discharge home today.

## 2024-11-08 NOTE — NURSING
Mother request baby be brought to room.  Ice and linen refreshed. Baby brought into room, placed in family member's arms.

## 2024-11-09 LAB
AT III ACT/NOR PPP CHRO: 102 % (ref 80–130)
B2 GLYCOPROT1 IGG SERPL IA-ACNC: <9.4 SGU
B2 GLYCOPROT1 IGM SERPL IA-ACNC: <9.4 SMU
CMV IGG SERPL QL IA: POSITIVE
MAYO GENERIC ORDERABLE RESULT: NORMAL

## 2024-11-11 LAB
B19V IGG SER QL IA: POSITIVE
B19V IGG+IGM SER-IMP: ABNORMAL
B19V IGM SER QL IA: NEGATIVE
CARDIOLIPIN IGA QUANT (OHS): 5.1 APL U/ML
CARDIOLIPIN IGG QUANT (OHS): 2.5 GPL U/ML
CARDIOLIPIN IGM QUANT (OHS): 2.6 MPL U/ML
MIXING STUDIES PPP-IMP: NORMAL
SCREEN DRVVT/NORMAL: 1.07 RATIO

## 2024-11-18 ENCOUNTER — OFFICE VISIT (OUTPATIENT)
Dept: OBSTETRICS AND GYNECOLOGY | Facility: CLINIC | Age: 34
End: 2024-11-18
Payer: MEDICAID

## 2024-11-18 VITALS — DIASTOLIC BLOOD PRESSURE: 62 MMHG | SYSTOLIC BLOOD PRESSURE: 106 MMHG | HEART RATE: 66 BPM

## 2024-11-18 PROCEDURE — 99213 OFFICE O/P EST LOW 20 MIN: CPT | Mod: ,,, | Performed by: OBSTETRICS & GYNECOLOGY

## 2024-11-18 PROCEDURE — 1160F RVW MEDS BY RX/DR IN RCRD: CPT | Mod: CPTII,,, | Performed by: OBSTETRICS & GYNECOLOGY

## 2024-11-18 PROCEDURE — 1159F MED LIST DOCD IN RCRD: CPT | Mod: CPTII,,, | Performed by: OBSTETRICS & GYNECOLOGY

## 2024-11-18 PROCEDURE — 3078F DIAST BP <80 MM HG: CPT | Mod: CPTII,,, | Performed by: OBSTETRICS & GYNECOLOGY

## 2024-11-18 PROCEDURE — 3074F SYST BP LT 130 MM HG: CPT | Mod: CPTII,,, | Performed by: OBSTETRICS & GYNECOLOGY

## 2024-11-18 PROCEDURE — 3044F HG A1C LEVEL LT 7.0%: CPT | Mod: CPTII,,, | Performed by: OBSTETRICS & GYNECOLOGY

## 2024-11-18 RX ORDER — SERTRALINE HYDROCHLORIDE 25 MG/1
25 TABLET, FILM COATED ORAL DAILY
COMMUNITY
End: 2024-11-19

## 2024-11-18 NOTE — PROGRESS NOTES
Patient ID: [id number]   Chief Complaint: FOLLOW UP VAGINAL DELIVERY FOR FETAL DEMISE AT 16 WEEKS 1 DAY  HPI:   Eddie Cheney is a 34 y.o.  here today for FOLLOW UP VAGINAL DELIVERY FOR FETAL DEMISE AT 16 WEEKS 1 DAY. STATES REMAINS VERY EMOTIONAL.DENIES SUICIDAL IDEATION. PRESENTLY SEEKING COUNSELING WITH  JOSE G LANDON AND BRIDGER CARRASQUILLO AT [facility] IN [place].    Patient's last menstrual period was 2024 (exact date).    Past Medical History:  has a past medical history of Abnormal Pap smear of cervix, Anxiety disorder, unspecified, Depression, and HPV in female.  Surgical History:  has a past surgical history that includes Ankle fracture surgery (Right); RIGHT HAND FRACTURE (Right); [place] tooth extraction; and Colposcopy.  Family History: family history includes Cancer in her maternal grandmother; Cervical cancer in her maternal grandmother; Diabetes in her mother; Heart disease in her father; Hypertension in her father.  Social History:  reports that she has been smoking cigarettes. She has a 5 pack-year smoking history. She has been exposed to tobacco smoke. She has never used smokeless tobacco. She reports that she does not currently use alcohol. She reports current drug use. Frequency: 7.00 times per week. Drug: Marijuana.  Current Outpatient Medications   Medication Sig Dispense Refill    ibuprofen (ADVIL,MOTRIN) 600 MG tablet Take 1 tablet (600 mg total) by mouth every 6 (six) hours as needed. 120 tablet 2    prenatal vit/iron fum/folic ac (PRENATAL 1+1 ORAL) Take by mouth. (Patient not taking: Reported on 2024)       No current facility-administered medications for this visit.     Patient is allergic to tramadol.  MENARCHEAL:  POST PARTUM  PAP:  Last PAP: 3/27/2024    History of Abnormal PAP Smear: YES:     Treated: Colposcopy  HPV Vaccine: NO  INTERCOURSE:  NOT ACTIVE AT PRESENT  History of STI: No  Current Birth Control Method: none  Sexually Active: no  BREAST HISTORY:    Last Mammogram: N/A  COLONOSCOPY:  Last Colonoscopy:  N/A     No results found for this or any previous visit (from the past 24 hours).    Subjective:   Review of Systems  12 point review of systems conducted, negative except as stated in the history of present illness. See HPI for details.  Objective:     Visit Vitals  /62 (BP Location: Left arm, Patient Position: Sitting)   Pulse 66   LMP 08/05/2024 (Exact Date)   Breastfeeding No     No results found for this or any previous visit (from the past 24 hours).  Physical Exam  Constitutional:  General Appearance : alert, in no acute distress, normal, well nourished.  Neck/Thyroid:  Inspection/Palpation: normal.  Respiratory:  Respiratory Effort: normal.  Gastrointestinal:  Abdomen: no masses. no tender, nondistended.  Liver and spleen: normal  Hernias: no hernias present, no inguinal adenopathy.  Genitourinary:  NO PELVIC EXAM  Chaperone Present  Assessment:       ICD-10-CM ICD-9-CM   1. Postpartum depression  F53.0 648.44     311     Plan   Postpartum depression    PT HAS SOUGHT OUT COUNSELING FROM BRIDGER CARRASQUILLO, PT IS TAKING MEDS AS PRESCRIBED, IS ALSO DOING CONSELING.   DR. BOYLE DID A THROMBOPHILIA PANEL AND TORCH PANEL- WILL COMPLETE THROMBOPHILIA IN 4 WKS AT F/U     Follow up in about 4 weeks (around 12/16/2024) for POST PARTUM. In addition to their scheduled follow up, the patient has also been instructed to follow up on as needed basis.     ROSSANA RAMOS MD

## 2025-08-19 ENCOUNTER — OFFICE VISIT (OUTPATIENT)
Dept: OBSTETRICS AND GYNECOLOGY | Facility: CLINIC | Age: 35
End: 2025-08-19
Payer: MEDICAID

## 2025-08-19 VITALS
HEIGHT: 65 IN | DIASTOLIC BLOOD PRESSURE: 82 MMHG | WEIGHT: 150 LBS | HEART RATE: 72 BPM | SYSTOLIC BLOOD PRESSURE: 118 MMHG | BODY MASS INDEX: 24.99 KG/M2

## 2025-08-19 DIAGNOSIS — Z01.419 ROUTINE GYNECOLOGICAL EXAMINATION: Primary | ICD-10-CM

## 2025-08-19 DIAGNOSIS — Z11.3 SCREEN FOR SEXUALLY TRANSMITTED DISEASES: ICD-10-CM

## 2025-08-19 DIAGNOSIS — Z12.4 CERVICAL CANCER SCREENING: ICD-10-CM

## 2025-08-19 PROCEDURE — 87661 TRICHOMONAS VAGINALIS AMPLIF: CPT | Performed by: NURSE PRACTITIONER

## 2025-08-19 PROCEDURE — 87491 CHLMYD TRACH DNA AMP PROBE: CPT | Performed by: NURSE PRACTITIONER

## 2025-08-19 PROCEDURE — 87591 N.GONORRHOEAE DNA AMP PROB: CPT | Performed by: NURSE PRACTITIONER

## 2025-08-19 RX ORDER — ACETAMINOPHEN 325 MG/1
325 TABLET ORAL EVERY 6 HOURS PRN
COMMUNITY

## 2025-08-20 PROCEDURE — 86780 TREPONEMA PALLIDUM: CPT | Performed by: NURSE PRACTITIONER

## 2025-08-20 PROCEDURE — 87389 HIV-1 AG W/HIV-1&-2 AB AG IA: CPT | Performed by: NURSE PRACTITIONER

## 2025-08-20 PROCEDURE — 86803 HEPATITIS C AB TEST: CPT | Performed by: NURSE PRACTITIONER

## 2025-08-20 PROCEDURE — 87340 HEPATITIS B SURFACE AG IA: CPT | Performed by: NURSE PRACTITIONER
